# Patient Record
Sex: FEMALE | Race: WHITE | Employment: FULL TIME | URBAN - METROPOLITAN AREA
[De-identification: names, ages, dates, MRNs, and addresses within clinical notes are randomized per-mention and may not be internally consistent; named-entity substitution may affect disease eponyms.]

---

## 2017-07-24 ENCOUNTER — APPOINTMENT (OUTPATIENT)
Dept: MAMMOGRAPHY | Age: 42
End: 2017-07-24

## 2017-07-24 ENCOUNTER — OFFICE VISIT (OUTPATIENT)
Dept: OBGYN CLINIC | Age: 42
End: 2017-07-24

## 2017-07-24 VITALS
SYSTOLIC BLOOD PRESSURE: 132 MMHG | WEIGHT: 161 LBS | BODY MASS INDEX: 27.49 KG/M2 | DIASTOLIC BLOOD PRESSURE: 80 MMHG | HEIGHT: 64 IN

## 2017-07-24 DIAGNOSIS — Z12.31 ENCOUNTER FOR SCREENING MAMMOGRAM FOR MALIGNANT NEOPLASM OF BREAST: ICD-10-CM

## 2017-07-24 DIAGNOSIS — Z01.419 ENCOUNTER FOR GYNECOLOGICAL EXAMINATION WITHOUT ABNORMAL FINDING: Primary | ICD-10-CM

## 2017-07-24 DIAGNOSIS — R92.8 ABNORMAL MAMMOGRAM OF BOTH BREASTS: ICD-10-CM

## 2017-07-24 NOTE — MR AVS SNAPSHOT
Visit Information Date & Time Provider Department Dept. Phone Encounter #  
 7/24/2017  9:40 AM Jabier Baker MD [facility] [phone] [id number] Upcoming Health Maintenance Date Due INFLUENZA AGE 9 TO ADULT 8/1/2017 PAP AKA CERVICAL CYTOLOGY 7/19/2019 Allergies as of 7/24/2017  Review Complete On: 7/24/2017 By: Luisito Alarcon Severity Noted Reaction Type Reactions Hydrocod-cpm-pe-acetaminophen  11/20/2013    Other (comments) Unknown reaction Current Immunizations  Never Reviewed No immunizations on file. Not reviewed this visit Vitals BP Height(growth percentile) Weight(growth percentile) BMI OB Status Smoking Status 132/80 5' 4\" (1.626 m) 161 lb (73 kg) 27.64 kg/m2 Having regular periods Never Smoker BMI and BSA Data Body Mass Index Body Surface Area  
 27.64 kg/m 2 1.82 m 2 Preferred Pharmacy Pharmacy Name Phone [address] [facility] AT [address] [place] [address]. [phone] Your Updated Medication List  
  
   
This list is accurate as of: 7/24/17  9:56 AM.  Always use your most recent med list.  
  
  
  
  
 ondansetron 4 mg disintegrating tablet Commonly known as:  ZOFRAN ODT Take 1 Tab by mouth every eight (8) hours as needed for Nausea. XANAX 0.25 mg tablet Generic drug:  ALPRAZolam  
Take  by mouth. ZOLOFT 100 mg tablet Generic drug:  sertraline Take  by mouth daily. Introducing [facility]! Dear William Drew: 
Thank you for requesting a [employer] account. Our records indicate that you already have an active [employer] account. You can access your account anytime at [email or web address] Did you know that you can access your hospital and ER discharge instructions at any time in [employer]? You can also review all of your test results from your hospital stay or ER visit. Additional Information If you have questions, please visit the Frequently Asked Questions section of the [employer] website at [email or web address] Remember, [employer] is NOT to be used for urgent needs. For medical emergencies, dial 911. Now available from your iPhone and Android! Please provide this summary of care documentation to your next provider. Your primary care clinician is listed as Marycruz Floyd. If you have any questions after today's visit, please call [phone].

## 2017-07-24 NOTE — PROGRESS NOTES
Annual exam ages 40-58    [place] is a ,  36 y.o. female [facility] No LMP recorded. .    She presents for her annual checkup. She is having significant left sided pain for about 2 weeks out of the month for the last several months. With regard to the Gardasil vaccine, she is older than the age for which it is FDA approved. Menstrual status:    Her periods are moderate in flow. She is using three to five pads or tampons per day, usually regular and last 26-30 days. She denies dysmenorrhea. She reports no premenstrual symptoms. Contraception:    The current method of family planning is vasectomy. Sexual history:    She  reports that she currently engages in sexual activity and has had male partners. She reports using the following method of birth control/protection: Surgical.    Medical conditions:    Since her last annual GYN exam about one year ago, she has not the following changes in her health history: none. Pap and Mammogram History:    Her most recent Pap smear was normal, obtained 1 year(s) ago. The patient had her mammogram today in our office. Breast Cancer History/Substance Abuse: negative    Osteoporosis History:    Family history does not include a first or second degree relative with osteopenia or osteoporosis. Past Medical History:   Diagnosis Date    Chronic urinary tract infection     Gastric ulcer     Pap smear for cervical cancer screening 16 neg HPV NEG    12 neg HPV NEG 16 neg HPV NEG     Past Surgical History:   Procedure Laterality Date    HX BREAST BIOPSY      HX CYST REMOVAL      HX SALPINGO-OOPHORECTOMY      l/s right       Current Outpatient Prescriptions   Medication Sig Dispense Refill    ALPRAZolam (XANAX) 0.25 mg tablet Take  by mouth.  sertraline (ZOLOFT) 100 mg tablet Take  by mouth daily.       ondansetron (ZOFRAN ODT) 4 mg disintegrating tablet Take 1 Tab by mouth every eight (8) hours as needed for Nausea. 10 Tab 0     Allergies: Hydrocod-cpm-pe-acetaminophen     Tobacco History:  reports that she has never smoked. She has never used smokeless tobacco.  Alcohol Abuse:  reports that she does not drink alcohol. Drug Abuse:  reports that she does not use illicit drugs.     Family Medical/Cancer History:   Family History   Problem Relation Age of Onset    Hypertension Maternal Grandfather     Heart Attack Maternal Grandfather     Breast Cancer Other      great aunt    No Known Problems Mother         Review of Systems - History obtained from the patient  Constitutional: negative for weight loss, fever, night sweats  HEENT: negative for hearing loss, earache, congestion, snoring, sorethroat  CV: negative for chest pain, palpitations, edema  Resp: negative for cough, shortness of breath, wheezing  GI: negative for change in bowel habits, abdominal pain, black or bloody stools  : negative for frequency, dysuria, hematuria, vaginal discharge  MSK: negative for back pain, joint pain, muscle pain  Breast: negative for breast lumps, nipple discharge, galactorrhea  Skin :negative for itching, rash, hives  Neuro: negative for dizziness, headache, confusion, weakness  Psych: negative for anxiety, depression, change in mood  Heme/lymph: negative for bleeding, bruising, pallor    Physical Exam    Visit Vitals    /80    Ht 5' 4\" (1.626 m)    Wt 161 lb (73 kg)    BMI 27.64 kg/m2       Constitutional  · Appearance: well-nourished, well developed, alert, in no acute distress    HENT  · Head and Face: appears normal    Neck  · Inspection/Palpation: normal appearance, no masses or tenderness  · Lymph Nodes: no lymphadenopathy present  · Thyroid: gland size normal, nontender, no nodules or masses present on palpation    Chest  · Respiratory Effort: breathing unlabored    Breasts  · Inspection of Breasts: breasts symmetrical, no skin changes, no discharge present, nipple appearance normal, no skin retraction present  · Palpation of Breasts and Axillae: no masses present on palpation, no breast tenderness  · Axillary Lymph Nodes: no lymphadenopathy present    Gastrointestinal  · Abdominal Examination: abdomen non-tender to palpation, normal bowel sounds, no masses present  · Liver and spleen: no hepatomegaly present, spleen not palpable  · Hernias: no hernias identified    Genitourinary  · External Genitalia: normal appearance for age, no discharge present, no tenderness present, no inflammatory lesions present, no masses present, no atrophy present  · Vagina: normal vaginal vault without central or paravaginal defects, no discharge present, no inflammatory lesions present, no masses present  · Bladder: non-tender to palpation  · Urethra: appears normal  · Cervix: normal   · Uterus: normal size, shape and consistency  · Adnexa: no adnexal tenderness present, no adnexal masses present  · Perineum: perineum within normal limits, no evidence of trauma, no rashes or skin lesions present  · Anus: anus within normal limits, no hemorrhoids present  · Inguinal Lymph Nodes: no lymphadenopathy present    Skin  · General Inspection: no rash, no lesions identified    Neurologic/Psychiatric  · Mental Status:  · Orientation: grossly oriented to person, place and time  · Mood and Affect: mood normal, affect appropriate    Assessment:  Routine gynecologic examination  Her current medical status is satisfactory with no evidence of significant gynecologic issues.     Plan:  Counseled re: diet, exercise, healthy lifestyle  Return for yearly wellness visits  Rec annual mammogram  RTO for ultrasound due to pelvic pain

## 2017-08-04 ENCOUNTER — TELEPHONE (OUTPATIENT)
Dept: OBGYN CLINIC | Age: 42
End: 2017-08-04

## 2017-08-04 ENCOUNTER — HOSPITAL ENCOUNTER (OUTPATIENT)
Dept: MAMMOGRAPHY | Age: 42
Discharge: HOME | End: 2017-08-04
Attending: OBSTETRICS & GYNECOLOGY
Payer: COMMERCIAL

## 2017-08-04 DIAGNOSIS — R92.8 ABNORMAL MAMMOGRAM OF BOTH BREASTS: ICD-10-CM

## 2017-08-04 PROCEDURE — 76642 ULTRASOUND BREAST LIMITED: CPT

## 2017-08-04 PROCEDURE — 77066 DX MAMMO INCL CAD BI: CPT

## 2017-08-04 NOTE — TELEPHONE ENCOUNTER
Eri Fischer from [address] [place] [address] calling about Patient's mammogram.  In the left breast there is a cyst that is complex and is recommended to have needle aspiration. The report will be sent over today.

## 2018-01-12 ENCOUNTER — PATIENT MESSAGE (OUTPATIENT)
Dept: OBGYN CLINIC | Age: 43
End: 2018-01-12

## 2018-01-12 RX ORDER — FLUCONAZOLE 150 MG/1
150 TABLET ORAL DAILY
Qty: 3 TAB | Refills: 0 | Status: SHIPPED | OUTPATIENT
Start: 2018-01-12 | End: 2018-01-15

## 2018-10-24 ENCOUNTER — OFFICE VISIT (OUTPATIENT)
Dept: OBGYN CLINIC | Age: 43
End: 2018-10-24

## 2018-10-24 VITALS
BODY MASS INDEX: 27.49 KG/M2 | SYSTOLIC BLOOD PRESSURE: 118 MMHG | HEIGHT: 64 IN | WEIGHT: 161 LBS | DIASTOLIC BLOOD PRESSURE: 82 MMHG

## 2018-10-24 DIAGNOSIS — Z01.419 ENCOUNTER FOR GYNECOLOGICAL EXAMINATION WITHOUT ABNORMAL FINDING: Primary | ICD-10-CM

## 2018-10-24 RX ORDER — CALC/MAG/B COMPLEX/D3/HERB 61
TABLET ORAL
COMMUNITY

## 2018-10-24 NOTE — PROGRESS NOTES
Annual exam ages 40-58    [place] is a ,  39 y.o. female [facility] No LMP recorded. .    She presents for her annual checkup. She is having no significant problems. With regard to the Gardasil vaccine, she is older than the age for which it is FDA approved. Menstrual status:    Her periods are absent due to menopause. She reports having some spotting in May 2018. She denies dysmenorrhea. She reports no premenstrual symptoms. Contraception:    The current method of family planning is vasectomy. Sexual history:    She  reports that she currently engages in sexual activity and has had partners who are Male. She reports using the following method of birth control/protection: Surgical.    Medical conditions:    Since her last annual GYN exam about one year ago, she has not the following changes in her health history: none. Pap and Mammogram History:    Her most recent Pap smear was normal, obtained 2 year(s) ago. The patient had her mammogram today in our office. Breast Cancer History/Substance Abuse: +great aunt    Osteoporosis History:    Family history does not include a first or second degree relative with osteopenia or osteoporosis. Past Medical History:   Diagnosis Date    Chronic urinary tract infection     Gastric ulcer     Pap smear for cervical cancer screening 16 neg HPV NEG    12 neg HPV NEG 16 neg HPV NEG     Past Surgical History:   Procedure Laterality Date    HX BREAST BIOPSY Left 2004    Cyst removed    HX CYST INCISION AND DRAINAGE Bilateral     HX CYST REMOVAL      HX SALPINGO-OOPHORECTOMY      l/s right       Current Outpatient Medications   Medication Sig Dispense Refill    lansoprazole (PREVACID) 15 mg capsule Take  by mouth Daily (before breakfast).  ALPRAZolam (XANAX) 0.25 mg tablet Take  by mouth.       ondansetron (ZOFRAN ODT) 4 mg disintegrating tablet Take 1 Tab by mouth every eight (8) hours as needed for Nausea. 10 Tab 0    sertraline (ZOLOFT) 100 mg tablet Take  by mouth daily. Allergies: Hydrocod-cpm-pe-acetaminophen     Tobacco History:  reports that  has never smoked. she has never used smokeless tobacco.  Alcohol Abuse:  reports that she does not drink alcohol. Drug Abuse:  reports that she does not use drugs.     Family Medical/Cancer History:   Family History   Problem Relation Age of Onset    Hypertension Maternal Grandfather     Heart Attack Maternal Grandfather     Breast Cancer Other         great aunt    No Known Problems Mother         Review of Systems - History obtained from the patient  Constitutional: negative for weight loss, fever, night sweats  HEENT: negative for hearing loss, earache, congestion, snoring, sorethroat  CV: negative for chest pain, palpitations, edema  Resp: negative for cough, shortness of breath, wheezing  GI: negative for change in bowel habits, abdominal pain, black or bloody stools  : negative for frequency, dysuria, hematuria, vaginal discharge  MSK: negative for back pain, joint pain, muscle pain  Breast: negative for breast lumps, nipple discharge, galactorrhea  Skin :negative for itching, rash, hives  Neuro: negative for dizziness, headache, confusion, weakness  Psych: negative for anxiety, depression, change in mood  Heme/lymph: negative for bleeding, bruising, pallor    Physical Exam    Visit Vitals  /82   Ht 5' 4\" (1.626 m)   Wt 161 lb (73 kg)   BMI 27.64 kg/m²       Constitutional  · Appearance: well-nourished, well developed, alert, in no acute distress    HENT  · Head and Face: appears normal    Neck  · Inspection/Palpation: normal appearance, no masses or tenderness  · Lymph Nodes: no lymphadenopathy present  · Thyroid: gland size normal, nontender, no nodules or masses present on palpation    Chest  · Respiratory Effort: breathing unlabored    Breasts  · Inspection of Breasts: breasts symmetrical, no skin changes, no discharge present, nipple appearance normal, no skin retraction present  · Palpation of Breasts and Axillae: no masses present on palpation, no breast tenderness  · Axillary Lymph Nodes: no lymphadenopathy present    Gastrointestinal  · Abdominal Examination: abdomen non-tender to palpation, normal bowel sounds, no masses present  · Liver and spleen: no hepatomegaly present, spleen not palpable  · Hernias: no hernias identified    Genitourinary  · External Genitalia: normal appearance for age, no discharge present, no tenderness present, no inflammatory lesions present, no masses present, no atrophy present  · Vagina: normal vaginal vault without central or paravaginal defects, no discharge present, no inflammatory lesions present, no masses present  · Bladder: non-tender to palpation  · Urethra: appears normal  · Cervix: normal   · Uterus: normal size, shape and consistency  · Adnexa: no adnexal tenderness present, no adnexal masses present  · Perineum: perineum within normal limits, no evidence of trauma, no rashes or skin lesions present  · Anus: anus within normal limits, no hemorrhoids present  · Inguinal Lymph Nodes: no lymphadenopathy present    Skin  · General Inspection: no rash, no lesions identified    Neurologic/Psychiatric  · Mental Status:  · Orientation: grossly oriented to person, place and time  · Mood and Affect: mood normal, affect appropriate    Assessment:  Routine gynecologic examination  Her current medical status is satisfactory with no evidence of significant gynecologic issues. .  Doing well, perimenopausal    Plan:  Counseled re: diet, exercise, healthy lifestyle  Return for yearly wellness visits  Rec annual mammogram

## 2018-10-24 NOTE — PATIENT INSTRUCTIONS

## 2018-11-20 ENCOUNTER — PATIENT MESSAGE (OUTPATIENT)
Dept: OBGYN CLINIC | Age: 43
End: 2018-11-20

## 2018-11-21 RX ORDER — FLUCONAZOLE 150 MG/1
150 TABLET ORAL DAILY
Qty: 3 TAB | Refills: 0 | Status: SHIPPED | OUTPATIENT
Start: 2018-11-21 | End: 2018-11-24

## 2019-01-07 ENCOUNTER — OFFICE VISIT (OUTPATIENT)
Dept: OBGYN CLINIC | Age: 44
End: 2019-01-07

## 2019-01-07 DIAGNOSIS — N94.9 VAGINAL BURNING: Primary | ICD-10-CM

## 2019-01-07 LAB
BILIRUB UR QL STRIP: NEGATIVE
GLUCOSE UR-MCNC: NEGATIVE MG/DL
KETONES P FAST UR STRIP-MCNC: NEGATIVE MG/DL
PH UR STRIP: 4.6 [PH] (ref 4.6–8)
PROT UR QL STRIP: NEGATIVE
SP GR UR STRIP: 1 (ref 1–1.03)
UA UROBILINOGEN AMB POC: NORMAL (ref 0.2–1)
URINALYSIS CLARITY POC: NORMAL
URINALYSIS COLOR POC: NORMAL
URINE BLOOD POC: NEGATIVE
URINE LEUKOCYTES POC: NEGATIVE
URINE NITRITES POC: NEGATIVE

## 2019-01-07 RX ORDER — NYSTATIN AND TRIAMCINOLONE ACETONIDE 100000; 1 [USP'U]/G; MG/G
OINTMENT TOPICAL 2 TIMES DAILY
Qty: 1 TUBE | Refills: 3 | Status: SHIPPED | OUTPATIENT
Start: 2019-01-07 | End: 2019-01-14

## 2019-01-07 NOTE — PROGRESS NOTES
Chief Complaint   Vaginitis      HPI  39 y.o. female complains of scant vaginal discharge for several weeks. .No LMP recorded. She admits to additional symptoms at this time. burning  The patient  admits to aggravating factors  She admits to exposure to new chemicals ot hygenic agents  Previous treatment included:  OTC monistat and diflucan    Past Medical History:   Diagnosis Date    Chronic urinary tract infection     Gastric ulcer     Pap smear for cervical cancer screening 7/19/16 neg HPV NEG    1/23/12 neg HPV NEG 7/19/16 neg HPV NEG     Past Surgical History:   Procedure Laterality Date    HX BREAST BIOPSY Left 12/2004    Cyst removed    HX CYST INCISION AND DRAINAGE Bilateral     HX CYST REMOVAL      HX SALPINGO-OOPHORECTOMY      l/s right     Social History     Occupational History    Not on file   Tobacco Use    Smoking status: Never Smoker    Smokeless tobacco: Never Used   Substance and Sexual Activity    Alcohol use: No    Drug use: No    Sexual activity: Yes     Partners: Male     Birth control/protection: Surgical     Family History   Problem Relation Age of Onset    Hypertension Maternal Grandfather     Heart Attack Maternal Grandfather     Breast Cancer Other         great aunt    No Known Problems Mother         Allergies   Allergen Reactions    Hydrocod-Cpm-Pe-Acetaminophen Other (comments)     Unknown reaction     Prior to Admission medications    Medication Sig Start Date End Date Taking? Authorizing Provider   lansoprazole (PREVACID) 15 mg capsule Take  by mouth Daily (before breakfast). Provider, Historical   ondansetron (ZOFRAN ODT) 4 mg disintegrating tablet Take 1 Tab by mouth every eight (8) hours as needed for Nausea. 1/11/16   Artis Bruce DO   ALPRAZolam Thierry Hail) 0.25 mg tablet Take  by mouth. Provider, Historical   sertraline (ZOLOFT) 100 mg tablet Take  by mouth daily.     Provider, Historical                      Review of Systems - History obtained from the patient  Constitutional: negative for weight loss, fever, night sweats  Breast: negative for breast lumps, nipple discharge, galactorrhea  GI: negative for change in bowel habits, abdominal pain, black or bloody stools  : negative for frequency, dysuria, hematuria  MSK: negative for back pain, joint pain, muscle pain  Skin: negative for itching, rash, hives  Neuro: negative for dizziness, headache, confusion, weakness  Psych: negative for anxiety, depression, change in mood  Heme/lymph: negative for bleeding, bruising, pallor       Objective: There were no vitals taken for this visit. Physical Exam:   PHYSICAL EXAMINATION    Constitutional  · Appearance: well-nourished, well developed, alert, in no acute distress    HENT  · Head and Face: appears normal    Genitourinary  · External Genitalia: normal appearance for age, + discharge present, no tenderness present, no inflammatory lesions present, no masses present, no atrophy present  · Vagina:  + discharge present, otherwise normal vaginal vault without central or paravaginal defects, no inflammatory lesions present, no masses present  · Bladder: non-tender to palpation  · Urethra: appears normal  · Cervix: normal   · Uterus: normal size, shape and consistency  · Adnexa: no adnexal tenderness present, no adnexal masses present  · Perineum: perineum within normal limits, no evidence of trauma, no rashes or skin lesions present  · Anus: anus within normal limits, no hemorrhoids present  · Inguinal Lymph Nodes: no lymphadenopathy present    Skin  · General Inspection: no rash, no lesions identified    Neurologic/Psychiatric  · Mental Status:  · Orientation: grossly oriented to person, place and time  · Mood and Affect: mood normal, affect appropriate      Assessment:   Vulvar burning- will treat with mycolog and f/u with nuswab. If no improvement consider clobetesol. Plan:   ROV prn if symptoms persist or worsen.

## 2019-01-09 LAB
A VAGINAE DNA VAG QL NAA+PROBE: NORMAL SCORE
BVAB2 DNA VAG QL NAA+PROBE: NORMAL SCORE
C ALBICANS DNA VAG QL NAA+PROBE: NEGATIVE
C GLABRATA DNA VAG QL NAA+PROBE: NEGATIVE
MEGA1 DNA VAG QL NAA+PROBE: NORMAL SCORE

## 2019-01-24 ENCOUNTER — OFFICE VISIT (OUTPATIENT)
Dept: OBGYN CLINIC | Age: 44
End: 2019-01-24

## 2019-01-24 ENCOUNTER — HOSPITAL ENCOUNTER (OUTPATIENT)
Dept: LAB | Age: 44
Discharge: HOME | End: 2019-01-24

## 2019-01-24 DIAGNOSIS — N94.9 VAGINAL BURNING: Primary | ICD-10-CM

## 2019-01-24 NOTE — PROGRESS NOTES
Vaginitis evaluation    Chief Complaint   Vaginitis      HPI  39 y.o. female complains of vulvar burningfor months. No LMP recorded. She denies additional symptoms at this time. The patient denies aggravating factors except urination. She is not concerned about possible STI exposure at this time. She denies exposure to new chemicals ot hygenic agents  Previous treatment included: diflucan and mycolog that resulted in minimal relief. Past Medical History:   Diagnosis Date    Chronic urinary tract infection     Gastric ulcer     Pap smear for cervical cancer screening 7/19/16 neg HPV NEG    1/23/12 neg HPV NEG 7/19/16 neg HPV NEG     Past Surgical History:   Procedure Laterality Date    HX BREAST BIOPSY Left 12/2004    Cyst removed    HX CYST INCISION AND DRAINAGE Bilateral     HX CYST REMOVAL      HX SALPINGO-OOPHORECTOMY      l/s right     Social History     Occupational History    Not on file   Tobacco Use    Smoking status: Never Smoker    Smokeless tobacco: Never Used   Substance and Sexual Activity    Alcohol use: No    Drug use: No    Sexual activity: Yes     Partners: Male     Birth control/protection: Surgical     Family History   Problem Relation Age of Onset    Hypertension Maternal Grandfather     Heart Attack Maternal Grandfather     Breast Cancer Other         great aunt    No Known Problems Mother         Allergies   Allergen Reactions    Hydrocod-Cpm-Pe-Acetaminophen Other (comments)     Unknown reaction     Prior to Admission medications    Medication Sig Start Date End Date Taking? Authorizing Provider   clobetasol (TEMOVATE) 0.05 % ointment Apply  to affected area two (2) times a day. 1/11/19   Arnoldo Duff MD   lansoprazole (PREVACID) 15 mg capsule Take  by mouth Daily (before breakfast). Provider, Historical   ondansetron (ZOFRAN ODT) 4 mg disintegrating tablet Take 1 Tab by mouth every eight (8) hours as needed for Nausea.  1/11/16   Marya Alvarado DO ALPRAZolam (XANAX) 0.25 mg tablet Take  by mouth. Provider, Historical   sertraline (ZOLOFT) 100 mg tablet Take  by mouth daily. Provider, Historical                      Review of Systems - History obtained from the patient  Constitutional: negative for weight loss, fever, night sweats  Breast: negative for breast lumps, nipple discharge, galactorrhea  GI: negative for change in bowel habits, abdominal pain, black or bloody stools  : negative for frequency, dysuria, hematuria  MSK: negative for back pain, joint pain, muscle pain  Skin: negative for itching, rash, hives  Neuro: negative for dizziness, headache, confusion, weakness  Psych: negative for anxiety, depression, change in mood  Heme/lymph: negative for bleeding, bruising, pallor       Objective: There were no vitals taken for this visit.     Physical Exam:   PHYSICAL EXAMINATION    Constitutional  · Appearance: well-nourished, well developed, alert, in no acute distress    HENT  · Head and Face: appears normal    Genitourinary  · External Genitalia: normal appearance for age, no discharge present, no tenderness present, no inflammatory lesions present, no masses present, no atrophy present  · Vagina:  no discharge present, otherwise normal vaginal vault without central or paravaginal defects, no inflammatory lesions present, no masses present  · Bladder: non-tender to palpation  · Urethra: appears normal  · Cervix: normal   · Uterus: normal size, shape and consistency  · Adnexa: no adnexal tenderness present, no adnexal masses present  · Perineum: perineum within normal limits, no evidence of trauma, no rashes or skin lesions present  · Anus: anus within normal limits, no hemorrhoids present  · Inguinal Lymph Nodes: no lymphadenopathy present    Skin  · General Inspection: no rash, no lesions identified    Neurologic/Psychiatric  · Mental Status:  · Orientation: grossly oriented to person, place and time  · Mood and Affect: mood normal, affect appropriate      Assessment:   Vaginitis, previous normal nuswab, will perform biopsy today. Will f/u with biopsy and repeat nuswab. Plan:   ROV prn if symptoms persist or worsen. [facility]  OFFICE PROCEDURE PROGRESS NOTE        Chart reviewed for the following:   Sarmad HAMEED, have reviewed the History, Physical and updated the Allergic reactions for Leon Menard performed immediately prior to start of procedure:   Sarmad HAMEED, have performed the following reviews on Kristal Basilio prior to the start of the procedure:            * Patient was identified by name and date of birth   * Agreement on procedure being performed was verified  * Risks and Benefits explained to the patient  * Procedure site verified and marked as necessary  * Patient was positioned for comfort  * Consent was signed and verified     Time: 324      Date of procedure: 1/24/2019    Procedure performed by:  General Elisa MD    Provider assisted by: Kashif Casanova    Patient assisted by: self    How tolerated by patient: tolerated the procedure well with no complications    Post Procedural Pain Scale: 2 - Hurts Little Bit    Comments: none  Procedure note: Vulvar/Vaginal biopsy    Indications:  Kristal Basilio is a 39 y.o. female [facility] that was found to have vaginal burning. After being presented with the risks, benefits and specific details of the procedure, she had no further questions. Procedure: The patient was placed on the table in a dorsal lithotomy position and draped in the appropriate manner. The area was prepped with Zephiran . Once cleansed, the area was injected with three cc's of 1% Lidocaine without epinephrine, using a 25 gauge needle. After adequate anesthesia was reached, the skin was flattened with one hand and the entire lesion of the abnormal area was excised with an iris scissors. It was 5 mm in size.  The specimen was placed in formalin and sent to pathology for evaluation. Pressure was applied to the biopsy site to control bleeding and no sutures were placed to close the wound. Hemostasis was adequate with direct pressure and silver nitrate. The patient tolerated the procedure well. There were no complications. She was observed for 10 minutes and was discharged in good condition.

## 2019-01-25 LAB — SPECIMEN SENT TO LAB,INSX: NORMAL

## 2019-02-06 LAB
DX ICD CODE: NORMAL
DX ICD CODE: NORMAL
PATH REPORT.FINAL DX SPEC: NORMAL
PATH REPORT.GROSS SPEC: NORMAL
PATH REPORT.SITE OF ORIGIN SPEC: NORMAL
PATHOLOGIST NAME: NORMAL
PAYMENT PROCEDURE: NORMAL

## 2019-06-24 ENCOUNTER — PATIENT MESSAGE (OUTPATIENT)
Dept: OBGYN CLINIC | Age: 44
End: 2019-06-24

## 2019-07-08 ENCOUNTER — OFFICE VISIT (OUTPATIENT)
Dept: OBGYN CLINIC | Age: 44
End: 2019-07-08

## 2019-07-08 DIAGNOSIS — N89.8 VAGINAL ITCHING: Primary | ICD-10-CM

## 2019-07-08 RX ORDER — DROSPIRENONE AND ETHINYL ESTRADIOL 0.02-3(28)
1 KIT ORAL DAILY
Qty: 3 TAB | Refills: 0 | Status: SHIPPED | OUTPATIENT
Start: 2019-07-08 | End: 2019-07-09 | Stop reason: CLARIF

## 2019-07-08 RX ORDER — NYSTATIN AND TRIAMCINOLONE ACETONIDE 100000; 1 [USP'U]/G; MG/G
OINTMENT TOPICAL 2 TIMES DAILY
Qty: 30 G | Refills: 0 | Status: SHIPPED | OUTPATIENT
Start: 2019-07-08 | End: 2019-07-15

## 2019-07-08 NOTE — PROGRESS NOTES
Chief Complaint   Vaginitis      HPI  38 y.o. female complains of white vaginal discharge for a few weeks. .No LMP recorded. She admits to additional symptoms at this time. Itching and irritation  The patient  denies aggravating factors  She denies exposure to new chemicals ot hygenic agents  Previous treatment included:  none    Past Medical History:   Diagnosis Date    Chronic urinary tract infection     Gastric ulcer     Pap smear for cervical cancer screening 7/19/16 neg HPV NEG    1/23/12 neg HPV NEG 7/19/16 neg HPV NEG     Past Surgical History:   Procedure Laterality Date    HX BREAST BIOPSY Left 12/2004    Cyst removed    HX CYST INCISION AND DRAINAGE Bilateral     HX CYST REMOVAL      HX SALPINGO-OOPHORECTOMY      l/s right     Social History     Occupational History    Not on file   Tobacco Use    Smoking status: Never Smoker    Smokeless tobacco: Never Used   Substance and Sexual Activity    Alcohol use: No    Drug use: No    Sexual activity: Yes     Partners: Male     Birth control/protection: Surgical     Family History   Problem Relation Age of Onset    Hypertension Maternal Grandfather     Heart Attack Maternal Grandfather     Breast Cancer Other         great aunt    No Known Problems Mother         Allergies   Allergen Reactions    Hydrocod-Cpm-Pe-Acetaminophen Other (comments)     Unknown reaction     Prior to Admission medications    Medication Sig Start Date End Date Taking? Authorizing Provider   clobetasol (TEMOVATE) 0.05 % ointment Apply  to affected area two (2) times a day. 1/11/19   Sony Stock MD   lansoprazole (PREVACID) 15 mg capsule Take  by mouth Daily (before breakfast). Provider, Historical   ondansetron (ZOFRAN ODT) 4 mg disintegrating tablet Take 1 Tab by mouth every eight (8) hours as needed for Nausea. 1/11/16   Alysia Fine DO   ALPRAZolam Makeda Batch) 0.25 mg tablet Take  by mouth.     Provider, Historical   sertraline (ZOLOFT) 100 mg tablet Take by mouth daily. Provider, Historical                      Review of Systems - History obtained from the patient  Constitutional: negative for weight loss, fever, night sweats  Breast: negative for breast lumps, nipple discharge, galactorrhea  GI: negative for change in bowel habits, abdominal pain, black or bloody stools  : negative for frequency, dysuria, hematuria  MSK: negative for back pain, joint pain, muscle pain  Skin: negative for itching, rash, hives  Neuro: negative for dizziness, headache, confusion, weakness  Psych: negative for anxiety, depression, change in mood  Heme/lymph: negative for bleeding, bruising, pallor       Objective: There were no vitals taken for this visit.     Physical Exam:   PHYSICAL EXAMINATION    Constitutional  · Appearance: well-nourished, well developed, alert, in no acute distress    HENT  · Head and Face: appears normal    Genitourinary  · External Genitalia: normal appearance for age, + discharge present, no tenderness present, no inflammatory lesions present, no masses present, no atrophy present  · Vagina:  + discharge present, otherwise normal vaginal vault without central or paravaginal defects, no inflammatory lesions present, no masses present  · Bladder: non-tender to palpation  · Urethra: appears normal  · Cervix: normal   · Uterus: normal size, shape and consistency  · Adnexa: no adnexal tenderness present, no adnexal masses present  · Perineum: perineum within normal limits, no evidence of trauma, no rashes or skin lesions present  · Anus: anus within normal limits, no hemorrhoids present  · Inguinal Lymph Nodes: no lymphadenopathy present    Skin  · General Inspection: no rash, no lesions identified    Neurologic/Psychiatric  · Mental Status:  · Orientation: grossly oriented to person, place and time  · Mood and Affect: mood normal, affect appropriate      Assessment:   Vaginitis with intense vaginal itching- likely yeast, will treat with diflucan and f/u with nuswab    Plan:   ROV prn if symptoms persist or worsen.

## 2019-07-09 ENCOUNTER — PATIENT MESSAGE (OUTPATIENT)
Dept: OBGYN CLINIC | Age: 44
End: 2019-07-09

## 2019-07-09 RX ORDER — FLUCONAZOLE 150 MG/1
150 TABLET ORAL DAILY
Qty: 3 TAB | Refills: 1 | Status: SHIPPED | OUTPATIENT
Start: 2019-07-09 | End: 2019-07-09 | Stop reason: SDUPTHER

## 2019-07-09 RX ORDER — FLUCONAZOLE 150 MG/1
150 TABLET ORAL DAILY
Qty: 3 TAB | Refills: 1 | Status: SHIPPED | OUTPATIENT
Start: 2019-07-09 | End: 2019-07-12

## 2019-07-17 LAB
A VAGINAE DNA VAG QL NAA+PROBE: ABNORMAL SCORE
BVAB2 DNA VAG QL NAA+PROBE: ABNORMAL SCORE
C ALBICANS DNA VAG QL NAA+PROBE: POSITIVE
C GLABRATA DNA VAG QL NAA+PROBE: NEGATIVE
MEGA1 DNA VAG QL NAA+PROBE: ABNORMAL SCORE

## 2019-11-18 ENCOUNTER — OFFICE VISIT (OUTPATIENT)
Dept: OBGYN CLINIC | Age: 44
End: 2019-11-18

## 2019-11-18 VITALS
DIASTOLIC BLOOD PRESSURE: 84 MMHG | SYSTOLIC BLOOD PRESSURE: 126 MMHG | WEIGHT: 152 LBS | HEIGHT: 64 IN | BODY MASS INDEX: 25.95 KG/M2

## 2019-11-18 DIAGNOSIS — N89.8 VAGINAL IRRITATION: Primary | ICD-10-CM

## 2019-11-18 DIAGNOSIS — Z01.419 ENCOUNTER FOR GYNECOLOGICAL EXAMINATION WITHOUT ABNORMAL FINDING: ICD-10-CM

## 2019-11-18 RX ORDER — NYSTATIN AND TRIAMCINOLONE ACETONIDE 100000; 1 [USP'U]/G; MG/G
OINTMENT TOPICAL 2 TIMES DAILY
Qty: 1 TUBE | Refills: 3 | Status: SHIPPED | OUTPATIENT
Start: 2019-11-18 | End: 2019-11-25

## 2019-11-18 NOTE — PROGRESS NOTES
Annual exam ages 40-58      [place] is a ,  38 y.o. female   No LMP recorded. She presents for her annual checkup. She is having significant vaginal irritation. She has used diflucan from PCP and clobetasol without improvement. Menstrual status:    Her periods are irregular- she states she has only had a few periods this year. She does not have dysmenorrhea. She reports no premenstrual symptoms. Contraception:    The current method of family planning is vasectomy. Hormonal status:  She reports no perimenstrual type symptoms. She is not having vasomotor symptoms. The patient is not using any ERT. Sexual history:    She  reports that she currently engages in sexual activity and has had partner(s) who are Male. She reports using the following method of birth control/protection: Surgical.    Medical conditions:    Since her last annual GYN exam about one year ago, she has not the following changes in her health history: none. Surgical history confirmed with patient. has a past surgical history that includes hx cyst removal; hx salpingo-oophorectomy; hx breast biopsy (Left, 2004); and hx cyst incision and drainage (Bilateral). Pap and Mammogram History:    Her most recent Pap smear was normal, obtained 3 year(s) ago. The patient had her mammogram today in our office. Breast Cancer History/Substance Abuse: +great aunt      Osteoporosis History:    Family history does not include a first or second degree relative with osteopenia or osteoporosis.       Past Medical History:   Diagnosis Date    Chronic urinary tract infection     Gastric ulcer     Pap smear for cervical cancer screening 16 neg HPV NEG    12 neg HPV NEG 16 neg HPV NEG     Past Surgical History:   Procedure Laterality Date    HX BREAST BIOPSY Left 2004    Cyst removed    HX CYST INCISION AND DRAINAGE Bilateral     HX CYST REMOVAL      HX SALPINGO-OOPHORECTOMY      l/s right       Current Outpatient Medications   Medication Sig Dispense Refill    clobetasol (TEMOVATE) 0.05 % ointment Apply  to affected area two (2) times a day. 15 g 0    lansoprazole (PREVACID) 15 mg capsule Take  by mouth Daily (before breakfast).  ondansetron (ZOFRAN ODT) 4 mg disintegrating tablet Take 1 Tab by mouth every eight (8) hours as needed for Nausea. 10 Tab 0    ALPRAZolam (XANAX) 0.25 mg tablet Take  by mouth.  sertraline (ZOLOFT) 100 mg tablet Take  by mouth daily. Allergies: Hydrocod-cpm-pe-acetaminophen     Tobacco History:  reports that she has never smoked. She has never used smokeless tobacco.  Alcohol Abuse:  reports that she does not drink alcohol. Drug Abuse:  reports that she does not use drugs. Family Medical/Cancer History:   Family History   Problem Relation Age of Onset    Hypertension Maternal Grandfather     Heart Attack Maternal Grandfather     Breast Cancer Other         great aunt    No Known Problems Mother         Review of Systems - History obtained from the patient  Constitutional: negative for weight loss, fever, night sweats  HEENT: negative for hearing loss, earache, congestion, snoring, sorethroat  CV: negative for chest pain, palpitations, edema  Resp: negative for cough, shortness of breath, wheezing  GI: negative for change in bowel habits, abdominal pain, black or bloody stools  : negative for frequency, dysuria, hematuria, vaginal discharge  MSK: negative for back pain, joint pain, muscle pain  Breast: negative for breast lumps, nipple discharge, galactorrhea  Skin :negative for itching, rash, hives  Neuro: negative for dizziness, headache, confusion, weakness  Psych: negative for anxiety, depression, change in mood  Heme/lymph: negative for bleeding, bruising, pallor    Physical Exam    There were no vitals taken for this visit.     Constitutional  · Appearance: well-nourished, well developed, alert, in no acute distress    HENT  · Head and Face: appears normal    Neck  · Inspection/Palpation: normal appearance, no masses or tenderness  · Lymph Nodes: no lymphadenopathy present  · Thyroid: gland size normal, nontender, no nodules or masses present on palpation    Chest  · Respiratory Effort: breathing unlabored    Breasts  · Inspection of Breasts: breasts symmetrical, no skin changes, no discharge present, nipple appearance normal, no skin retraction present  · Palpation of Breasts and Axillae: no masses present on palpation, no breast tenderness  · Axillary Lymph Nodes: no lymphadenopathy present    Gastrointestinal  · Abdominal Examination: abdomen non-tender to palpation, normal bowel sounds, no masses present  · Liver and spleen: no hepatomegaly present, spleen not palpable  · Hernias: no hernias identified    Genitourinary  · External Genitalia: normal appearance for age, no discharge present, no tenderness present, no inflammatory lesions present, no masses present, no atrophy present  · Vagina: normal vaginal vault without central or paravaginal defects, no discharge present, no inflammatory lesions present, no masses present  · Bladder: non-tender to palpation  · Urethra: appears normal  · Cervix: normal   · Uterus: normal size, shape and consistency  · Adnexa: no adnexal tenderness present, no adnexal masses present  · Perineum: perineum within normal limits, no evidence of trauma, no rashes or skin lesions present  · Anus: anus within normal limits, no hemorrhoids present  · Inguinal Lymph Nodes: no lymphadenopathy present    Skin  · General Inspection: no rash, no lesions identified    Neurologic/Psychiatric  · Mental Status:  · Orientation: grossly oriented to person, place and time  · Mood and Affect: mood normal, affect appropriate    Assessment:  Routine gynecologic examination  Her current medical status is satisfactory with no evidence of significant gynecologic issues.   New vulvar irritation- will treat with mycolog and f/u with nuswab    Plan:  Counseled re: diet, exercise, healthy lifestyle  Return for yearly wellness visits  Rec annual mammogram

## 2020-02-12 ENCOUNTER — HOSPITAL ENCOUNTER (OUTPATIENT)
Dept: NUCLEAR MEDICINE | Age: 45
Discharge: HOME | End: 2020-02-12
Attending: INTERNAL MEDICINE
Payer: COMMERCIAL

## 2020-02-12 DIAGNOSIS — R19.7 DIARRHEA: ICD-10-CM

## 2020-02-12 DIAGNOSIS — R68.81 EARLY SATIETY: ICD-10-CM

## 2020-02-12 DIAGNOSIS — R10.11 RUQ PAIN: ICD-10-CM

## 2020-02-12 DIAGNOSIS — R11.0 NAUSEA: ICD-10-CM

## 2020-02-12 DIAGNOSIS — K21.9 GERD (GASTROESOPHAGEAL REFLUX DISEASE): ICD-10-CM

## 2020-02-12 DIAGNOSIS — R19.4 CHANGE IN BOWEL HABIT: ICD-10-CM

## 2020-02-12 PROCEDURE — 78227 HEPATOBIL SYST IMAGE W/DRUG: CPT

## 2020-02-26 ENCOUNTER — PATIENT MESSAGE (OUTPATIENT)
Dept: OBGYN CLINIC | Age: 45
End: 2020-02-26

## 2020-02-26 RX ORDER — LIDOCAINE 50 MG/G
0.01 OINTMENT TOPICAL AS NEEDED
Qty: 1 TUBE | Refills: 3 | Status: SHIPPED | OUTPATIENT
Start: 2020-02-26

## 2020-02-27 ENCOUNTER — TELEPHONE (OUTPATIENT)
Dept: OBGYN CLINIC | Age: 45
End: 2020-02-27

## 2020-02-27 NOTE — TELEPHONE ENCOUNTER
Call received at 605am    38year old patient last seen in the office on 11/18/19. [facility] calling to request a pathology report. This nurse advised of need to send the office a faxed request to get the report. Fax number provided.

## 2020-03-10 ENCOUNTER — OFFICE VISIT (OUTPATIENT)
Dept: OBGYN CLINIC | Age: 45
End: 2020-03-10

## 2020-03-10 VITALS — BODY MASS INDEX: 25.06 KG/M2 | SYSTOLIC BLOOD PRESSURE: 139 MMHG | WEIGHT: 146 LBS | DIASTOLIC BLOOD PRESSURE: 77 MMHG

## 2020-03-10 DIAGNOSIS — N94.9: Primary | ICD-10-CM

## 2020-03-10 NOTE — PROGRESS NOTES
Gary Doyle is a [de-identified] P5,  38 y.o. female [facility] whose No LMP recorded. Patient is perimenopausal.  was on  who presents with a history of dyspareunia for a few months. The pain is described as deep and during intercourse. Pain occurs primarily in the \"cervix\". She reports that even with minimal touch of her cervix she has burning and pain. She also experiences this burning sensation even when she is not having intercourse. She reports the following aggravating factors: intercourse    No results found for any visits on 03/10/20. Past Medical History:   Diagnosis Date    Chronic urinary tract infection     Gastric ulcer     Pap smear for cervical cancer screening 7/19/16 neg HPV NEG    1/23/12 neg HPV NEG 7/19/16 neg HPV NEG     Past Surgical History:   Procedure Laterality Date    HX BREAST BIOPSY Left 12/2004    Cyst removed    HX CYST INCISION AND DRAINAGE Bilateral     HX CYST REMOVAL      HX SALPINGO-OOPHORECTOMY      l/s right     Social History     Occupational History    Not on file   Tobacco Use    Smoking status: Never Smoker    Smokeless tobacco: Never Used   Substance and Sexual Activity    Alcohol use: No    Drug use: No    Sexual activity: Yes     Partners: Male     Birth control/protection: Surgical     Family History   Problem Relation Age of Onset    Hypertension Maternal Grandfather     Heart Attack Maternal Grandfather     Breast Cancer Other         great aunt    No Known Problems Mother        Allergies   Allergen Reactions    Hydrocod-Cpm-Pe-Acetaminophen Other (comments)     Unknown reaction     Prior to Admission medications    Medication Sig Start Date End Date Taking? Authorizing Provider   lidocaine (XYLOCAINE) 5 % ointment Apply 0.007 Tubes to affected area as needed for Pain. 2/26/20   Timo Stewart MD   estradiol (ESTRACE) 0.01 % (0.1 mg/gram) vaginal cream Insert 1 g into vagina two (2) times a week.  12/6/19   Kip Killer MD SPIKE   clobetasol (TEMOVATE) 0.05 % ointment Apply  to affected area two (2) times a day. 1/11/19   Stepan La MD   lansoprazole (PREVACID) 15 mg capsule Take  by mouth Daily (before breakfast). Provider, Historical   ondansetron (ZOFRAN ODT) 4 mg disintegrating tablet Take 1 Tab by mouth every eight (8) hours as needed for Nausea. 1/11/16   Renzo Estevez DO   ALPRAZolam Marrion Jeans) 0.25 mg tablet Take  by mouth. Provider, Historical   sertraline (ZOLOFT) 100 mg tablet Take  by mouth daily.     Provider, Historical        Review of Systems: History obtained from the patient  Constitutional: negative for weight loss, fever, night sweats  GI: negative for change in bowel habits, abdominal pain, black or bloody stools  : negative for frequency, dysuria, hematuria, vaginal discharge  MSK: negative for back pain, joint pain, muscle pain  Skin: negative for itching, rash, hives  Neuro: negative for dizziness, headache, confusion, weakness  Psych: negative for anxiety, depression, change in mood  Heme/lymph: negative for bleeding, bruising, pallor    Objective:  Visit Vitals  /77   Wt 146 lb (66.2 kg)   BMI 25.06 kg/m²       Physical Exam:   PHYSICAL EXAMINATION    Constitutional  · Appearance: well-nourished, well developed, alert, in no acute distress    HENT  · Head and Face: appears normal    Gastrointestinal  · Abdominal Examination: abdomen non-tender to palpation, normal bowel sounds, no masses present  · Liver and spleen: no hepatomegaly present, spleen not palpable  · Hernias: no hernias identified    Genitourinary  · External Genitalia: normal appearance for age, no discharge present, no tenderness present, no inflammatory lesions present, no masses present, no atrophy present  · Vagina: normal vaginal vault without central or paravaginal defects, no discharge present, no inflammatory lesions present, no masses present  · Bladder: non-tender to palpation  · Urethra: appears normal  · Cervix: normal   · Uterus: normal size, shape and consistency  · Adnexa: no adnexal tenderness present, no adnexal masses present  · Perineum: perineum within normal limits, no evidence of trauma, no rashes or skin lesions present  · Anus: anus within normal limits, no hemorrhoids present  · Inguinal Lymph Nodes: no lymphadenopathy present    Skin  · General Inspection: no rash, no lesions identified    Neurologic/Psychiatric  · Mental Status:  · Orientation: grossly oriented to person, place and time  · Mood and Affect: mood normal, affect appropriate    Assessment:     Dyspareunia Extreme Cervical tenderness to minimal stimuli. Pt reports no concerns re: STD's, previous nuswab negative. Biopsy performed of her cervix and sent to pathology. RTO prn if symptoms persist or worsen. Instructions given to pt. Handouts given to pt. [facility]  OFFICE PROCEDURE PROGRESS NOTE        Chart reviewed for the following:   Ligia Ritchie MD, have reviewed the History, Physical and updated the Allergic reactions for [address] performed immediately prior to start of procedure:   Ligia Ritchie MD, have performed the following reviews on [facility] prior to the start of the procedure:            * Patient was identified by name and date of birth   * Agreement on procedure being performed was verified  * Risks and Benefits explained to the patient  * Procedure site verified and marked as necessary  * Patient was positioned for comfort  * Consent was signed and verified     Time: 1130    Date of procedure: 3/10/2020    Procedure performed by:  Trey Solares MD    Patient assisted by: self    How tolerated by patient: tolerated the procedure well but with the following complications: complaints of burning in the location of the biopsy     Post Procedural Pain Scale: 8 - Hurts Whole Lot. Lidocaine was subsequently applied.     Comments: none    Procedure note: Cervical Biopsy    Indications:  Darrell Boland is a 38 y.o. female [facility] that was found to have extreme sensitivity of her cervix. She has she has no obvious lesions. After being presented with the risks, benefits and specific details of the procedure, informed consent was obtained and signed and she had no further questions. Procedure: The patient was placed on the table in a dorsal lithotomy position and draped in the appropriate manner. The area was inspected and her cervix was cleansed with Zephorin. The area of tenderness was then biopsied with a Kevorchian punch biopsy. Pt reports severe burning at the biopsy site so 3 cc lidocaine was subsequently injected into her cervix. Monsels solution was applied. Excellent hemostasis was obtained. The patient tolerated the procedure well. There were no complications.

## 2020-03-13 LAB
DX ICD CODE: NORMAL
PATH REPORT.FINAL DX SPEC: NORMAL
PATH REPORT.GROSS SPEC: NORMAL
PATH REPORT.RELEVANT HX SPEC: NORMAL
PATH REPORT.SITE OF ORIGIN SPEC: NORMAL
PATHOLOGIST NAME: NORMAL
PAYMENT PROCEDURE: NORMAL

## 2020-03-18 ENCOUNTER — PATIENT MESSAGE (OUTPATIENT)
Dept: OBGYN CLINIC | Age: 45
End: 2020-03-18

## 2021-01-08 NOTE — TELEPHONE ENCOUNTER
HR=64 bpm, BCMV=710/71 mmhg, TpR9=701.0 %, Resp=17 B/min, EtCO2=34 mmHg, Apnea=1 Seconds, Pain=0, Edmund=10, Galvez=2, Comment=sinus Spoke with pt and she has an appt with Dr. Caprice Yepez on 8/9/17

## 2021-03-29 ENCOUNTER — OFFICE VISIT (OUTPATIENT)
Dept: OBGYN CLINIC | Age: 46
End: 2021-03-29

## 2021-03-29 VITALS — WEIGHT: 149.8 LBS | SYSTOLIC BLOOD PRESSURE: 161 MMHG | BODY MASS INDEX: 25.71 KG/M2 | DIASTOLIC BLOOD PRESSURE: 99 MMHG

## 2021-03-29 DIAGNOSIS — Z01.419 ENCOUNTER FOR GYNECOLOGICAL EXAMINATION WITHOUT ABNORMAL FINDING: Primary | ICD-10-CM

## 2021-03-29 PROCEDURE — 99396 PREV VISIT EST AGE 40-64: CPT | Performed by: OBSTETRICS & GYNECOLOGY

## 2021-03-29 NOTE — PROGRESS NOTES
Annual exam ages 40-58      [place] is a ,  54 y.o. female   No LMP recorded. Patient is perimenopausal.    She presents for her annual checkup. She is having no significant problems. With regard to the Gardasil vaccine, she is older than the FDA approved age to receive it. Menstrual status:    Her periods are absent in flow. Contraception:    The current method of family planning is vasectomy. Hormonal status:  She reports no perimenstrual type symptoms. She is not having vasomotor symptoms. The patient is not using any ERT. Sexual history:    She  reports being sexually active and has had partner(s) who are Male. She reports using the following method of birth control/protection: Surgical.    Medical conditions:    Since her last annual GYN exam about two years ago, she has not the following changes in her health history: none. Surgical history confirmed with patient. has a past surgical history that includes hx cyst removal; hx salpingo-oophorectomy; hx breast biopsy (Left, 2004); and hx cyst incision and drainage (Bilateral). Pap and Mammogram History:    Her most recent Pap smear was normal, obtained 5 year(s) ago. The patient had her mammogram today in our office. Breast Cancer History/Substance Abuse: negative      Osteoporosis History:    Family history does not include a first or second degree relative with osteopenia or osteoporosis.     Past Medical History:   Diagnosis Date    Chronic urinary tract infection     Gastric ulcer     Pap smear for cervical cancer screening 16 neg HPV NEG    12 neg HPV NEG 16 neg HPV NEG     Past Surgical History:   Procedure Laterality Date    HX BREAST BIOPSY Left 2004    Cyst removed    HX CYST INCISION AND DRAINAGE Bilateral     HX CYST REMOVAL      HX SALPINGO-OOPHORECTOMY      l/s right       Current Outpatient Medications   Medication Sig Dispense Refill    clobetasol (TEMOVATE) 0.05 % ointment Apply  to affected area two (2) times a day. 15 g 0    lansoprazole (PREVACID) 15 mg capsule Take  by mouth Daily (before breakfast).  ALPRAZolam (XANAX) 0.25 mg tablet Take  by mouth.  sertraline (ZOLOFT) 100 mg tablet Take  by mouth daily.  lidocaine (XYLOCAINE) 5 % ointment Apply 0.007 Tubes to affected area as needed for Pain. 1 Tube 3    estradiol (ESTRACE) 0.01 % (0.1 mg/gram) vaginal cream Insert 1 g into vagina two (2) times a week. 42.5 g 5    ondansetron (ZOFRAN ODT) 4 mg disintegrating tablet Take 1 Tab by mouth every eight (8) hours as needed for Nausea. 10 Tab 0     Allergies: Hydrocod-cpm-pe-acetaminophen     Tobacco History:  reports that she has never smoked. She has never used smokeless tobacco.  Alcohol Abuse:  reports no history of alcohol use. Drug Abuse:  reports no history of drug use.     Family Medical/Cancer History:   Family History   Problem Relation Age of Onset    Hypertension Maternal Grandfather     Heart Attack Maternal Grandfather     Breast Cancer Other         great aunt    No Known Problems Mother         Review of Systems - History obtained from the patient  Constitutional: negative for weight loss, fever, night sweats  HEENT: negative for hearing loss, earache, congestion, snoring, sorethroat  CV: negative for chest pain, palpitations, edema  Resp: negative for cough, shortness of breath, wheezing  GI: negative for change in bowel habits, abdominal pain, black or bloody stools  : negative for frequency, dysuria, hematuria, vaginal discharge  MSK: negative for back pain, joint pain, muscle pain  Breast: negative for breast lumps, nipple discharge, galactorrhea  Skin :negative for itching, rash, hives  Neuro: negative for dizziness, headache, confusion, weakness  Psych: negative for anxiety, depression, change in mood  Heme/lymph: negative for bleeding, bruising, pallor    Physical Exam    Visit Vitals  BP (!) 161/99   Wt 149 lb 12.8 oz (67.9 kg) BMI 25.71 kg/m²       Constitutional  · Appearance: well-nourished, well developed, alert, in no acute distress    HENT  · Head and Face: appears normal    Neck  · Inspection/Palpation: normal appearance, no masses or tenderness  · Lymph Nodes: no lymphadenopathy present  · Thyroid: gland size normal, nontender, no nodules or masses present on palpation    Chest  · Respiratory Effort: breathing unlabored    Breasts  · Inspection of Breasts: breasts symmetrical, no skin changes, no discharge present, nipple appearance normal, no skin retraction present  · Palpation of Breasts and Axillae: Small mass immediatly supeior and lateral to right nipple, approx 0.5 cm, nontender  · Axillary Lymph Nodes: no lymphadenopathy present    Gastrointestinal  · Abdominal Examination: abdomen non-tender to palpation, normal bowel sounds, no masses present  · Liver and spleen: no hepatomegaly present, spleen not palpable  · Hernias: no hernias identified    Genitourinary  · External Genitalia: normal appearance for age, no discharge present, no tenderness present, no inflammatory lesions present, no masses present, no atrophy present  · Vagina: normal vaginal vault without central or paravaginal defects, no discharge present, no inflammatory lesions present, no masses present  · Bladder: non-tender to palpation  · Urethra: appears normal  · Cervix: normal, stenotic   · Uterus: normal size, shape and consistency  · Adnexa: no adnexal tenderness present, no adnexal masses present  · Perineum: perineum within normal limits, no evidence of trauma, no rashes or skin lesions present  · Anus: anus within normal limits, no hemorrhoids present  · Inguinal Lymph Nodes: no lymphadenopathy present    Skin  · General Inspection: no rash, no lesions identified    Neurologic/Psychiatric  · Mental Status:  · Orientation: grossly oriented to person, place and time  · Mood and Affect: mood normal, affect appropriate    Assessment:  Routine gynecologic examination  Her current medical status is satisfactory with no evidence of significant gynecologic issues. Right breast mass- pt will f/u with Dr. Suha Sullivan (previously removed papillomas)- offered to schedule and pt prefers to schedule herself.     Plan:  Counseled re: diet, exercise, healthy lifestyle  Return for yearly wellness visits  Rec annual mammogram

## 2021-04-02 LAB
CYTOLOGIST CVX/VAG CYTO: ABNORMAL
CYTOLOGY CVX/VAG DOC CYTO: ABNORMAL
CYTOLOGY CVX/VAG DOC THIN PREP: ABNORMAL
CYTOLOGY HISTORY:: ABNORMAL
DX ICD CODE: ABNORMAL
DX ICD CODE: ABNORMAL
HPV I/H RISK 4 DNA CVX QL PROBE+SIG AMP: NEGATIVE
Lab: ABNORMAL
OTHER STN SPEC: ABNORMAL
PATHOLOGIST CVX/VAG CYTO: ABNORMAL
STAT OF ADQ CVX/VAG CYTO-IMP: ABNORMAL

## 2021-04-06 ENCOUNTER — IMMUNIZATION (OUTPATIENT)
Dept: INTERNAL MEDICINE CLINIC | Age: 46
End: 2021-04-06
Payer: COMMERCIAL

## 2021-04-06 DIAGNOSIS — Z23 ENCOUNTER FOR IMMUNIZATION: Primary | ICD-10-CM

## 2021-04-06 PROCEDURE — 91300 COVID-19, MRNA, LNP-S, PF, 30MCG/0.3ML DOSE(PFIZER): CPT | Performed by: FAMILY MEDICINE

## 2021-04-06 PROCEDURE — 0001A COVID-19, MRNA, LNP-S, PF, 30MCG/0.3ML DOSE(PFIZER): CPT | Performed by: FAMILY MEDICINE

## 2021-04-27 ENCOUNTER — IMMUNIZATION (OUTPATIENT)
Dept: INTERNAL MEDICINE CLINIC | Age: 46
End: 2021-04-27
Payer: COMMERCIAL

## 2021-04-27 DIAGNOSIS — Z23 ENCOUNTER FOR IMMUNIZATION: Primary | ICD-10-CM

## 2021-04-27 PROCEDURE — 91300 COVID-19, MRNA, LNP-S, PF, 30MCG/0.3ML DOSE(PFIZER): CPT | Performed by: FAMILY MEDICINE

## 2021-04-27 PROCEDURE — 0002A COVID-19, MRNA, LNP-S, PF, 30MCG/0.3ML DOSE(PFIZER): CPT | Performed by: FAMILY MEDICINE

## 2022-05-09 ENCOUNTER — OFFICE VISIT (OUTPATIENT)
Dept: OBGYN CLINIC | Age: 47
End: 2022-05-09

## 2022-05-09 VITALS — SYSTOLIC BLOOD PRESSURE: 136 MMHG | BODY MASS INDEX: 24.89 KG/M2 | WEIGHT: 145 LBS | DIASTOLIC BLOOD PRESSURE: 77 MMHG

## 2022-05-09 DIAGNOSIS — Z01.419 ENCOUNTER FOR GYNECOLOGICAL EXAMINATION WITHOUT ABNORMAL FINDING: ICD-10-CM

## 2022-05-09 DIAGNOSIS — N89.8 VAGINAL DISCHARGE: Primary | ICD-10-CM

## 2022-05-09 PROCEDURE — 99396 PREV VISIT EST AGE 40-64: CPT | Performed by: OBSTETRICS & GYNECOLOGY

## 2022-05-09 RX ORDER — CONJUGATED ESTROGENS 0.62 MG/G
CREAM VAGINAL
COMMUNITY
Start: 2021-06-17

## 2022-05-09 NOTE — PROGRESS NOTES
Annual exam ages 40-58      [place] is a ,  49 y.o. female   No LMP recorded. Patient is perimenopausal.    She presents for her annual checkup. She is having significant vaginal discharge and vaginal burning. Menstrual status:    Her periods are absent. Contraception:    The current method of family planning is NA post menopause. Hormonal status:  She reports no perimenstrual type symptoms. She is not having vasomotor symptoms. The patient is not using any ERT. Sexual history:    She  reports being sexually active and has had partner(s) who are male. She reports using the following method of birth control/protection: Surgical.    Medical conditions:    Since her last annual GYN exam about one year ago, she has not the following changes in her health history: none. Surgical history confirmed with patient. has a past surgical history that includes hx cyst removal; hx salpingo-oophorectomy; hx breast biopsy (Left, 2004); and hx cyst incision and drainage (Bilateral). Pap and Mammogram History:    Her most recent Pap smear was ASCUS HPV NEG, obtained 1 year(s) ago. The patient had her mammogram today in our office. Breast Cancer History/Substance Abuse: negative      Osteoporosis History:    Family history does not include a first or second degree relative with osteopenia or osteoporosis. Past Medical History:   Diagnosis Date    Chronic urinary tract infection     Gastric ulcer     Pap smear for cervical cancer screening 16 neg HPV NEG    12 neg HPV NEG 16 neg HPV NEG     Past Surgical History:   Procedure Laterality Date    HX BREAST BIOPSY Left 2004    Cyst removed    HX CYST INCISION AND DRAINAGE Bilateral     HX CYST REMOVAL      HX SALPINGO-OOPHORECTOMY      l/s right       Current Outpatient Medications   Medication Sig Dispense Refill    lidocaine (XYLOCAINE) 5 % ointment Apply 0.007 Tubes to affected area as needed for Pain. 1 Tube 3    estradiol (ESTRACE) 0.01 % (0.1 mg/gram) vaginal cream Insert 1 g into vagina two (2) times a week. 42.5 g 5    clobetasol (TEMOVATE) 0.05 % ointment Apply  to affected area two (2) times a day. 15 g 0    lansoprazole (PREVACID) 15 mg capsule Take  by mouth Daily (before breakfast).  ondansetron (ZOFRAN ODT) 4 mg disintegrating tablet Take 1 Tab by mouth every eight (8) hours as needed for Nausea. 10 Tab 0    ALPRAZolam (XANAX) 0.25 mg tablet Take  by mouth.  sertraline (ZOLOFT) 100 mg tablet Take  by mouth daily. Allergies: Hydrocod-cpm-pe-acetaminophen     Tobacco History:  reports that she has never smoked. She has never used smokeless tobacco.  Alcohol Abuse:  reports no history of alcohol use. Drug Abuse:  reports no history of drug use. Family Medical/Cancer History:   Family History   Problem Relation Age of Onset    Hypertension Maternal Grandfather     Heart Attack Maternal Grandfather     Breast Cancer Other         great aunt    No Known Problems Mother         Review of Systems - History obtained from the patient  Constitutional: negative for weight loss, fever, night sweats  HEENT: negative for hearing loss, earache, congestion, snoring, sorethroat  CV: negative for chest pain, palpitations, edema  Resp: negative for cough, shortness of breath, wheezing  GI: negative for change in bowel habits, abdominal pain, black or bloody stools  : negative for frequency, dysuria, hematuria, vaginal discharge  MSK: negative for back pain, joint pain, muscle pain  Breast: negative for breast lumps, nipple discharge, galactorrhea  Skin :negative for itching, rash, hives  Neuro: negative for dizziness, headache, confusion, weakness  Psych: negative for anxiety, depression, change in mood  Heme/lymph: negative for bleeding, bruising, pallor    Physical Exam    There were no vitals taken for this visit.     Constitutional  · Appearance: well-nourished, well developed, alert, in no acute distress    HENT  · Head and Face: appears normal    Neck  · Inspection/Palpation: normal appearance, no masses or tenderness  · Lymph Nodes: no lymphadenopathy present  · Thyroid: gland size normal, nontender, no nodules or masses present on palpation    Chest  · Respiratory Effort: breathing unlabored    Breasts  · Inspection of Breasts: breasts symmetrical, no skin changes, no discharge present, nipple appearance normal, no skin retraction present  · Palpation of Breasts and Axillae: no masses present on palpation, no breast tenderness  · Axillary Lymph Nodes: no lymphadenopathy present    Gastrointestinal  · Abdominal Examination: abdomen non-tender to palpation, normal bowel sounds, no masses present  · Liver and spleen: no hepatomegaly present, spleen not palpable  · Hernias: no hernias identified    Genitourinary  · External Genitalia: normal appearance for age, no discharge present, no tenderness present, no inflammatory lesions present, no masses present, no atrophy present  · Vagina: normal vaginal vault without central or paravaginal defects, no discharge present, no inflammatory lesions present, no masses present  · Bladder: non-tender to palpation  · Urethra: appears normal  · Cervix: normal   · Uterus: normal size, shape and consistency  · Adnexa: no adnexal tenderness present, no adnexal masses present  · Perineum: perineum within normal limits, no evidence of trauma, no rashes or skin lesions present  · Anus: anus within normal limits, no hemorrhoids present  · Inguinal Lymph Nodes: no lymphadenopathy present    Skin  · General Inspection: no rash, no lesions identified    Neurologic/Psychiatric  · Mental Status:  · Orientation: grossly oriented to person, place and time  · Mood and Affect: mood normal, affect appropriate    Assessment:  Routine gynecologic examination  Her current medical status is satisfactory with no evidence of significant gynecologic issues.   Vaginitis- will f/u with nuswab    Plan:  Counseled re: diet, exercise, healthy lifestyle  Return for yearly wellness visits  Rec annual mammogram

## 2022-11-08 ENCOUNTER — HOSPITAL ENCOUNTER (OUTPATIENT)
Dept: PREADMISSION TESTING | Age: 47
Discharge: HOME | End: 2022-11-08
Payer: COMMERCIAL

## 2022-11-08 ENCOUNTER — ANESTHESIA EVENT (OUTPATIENT)
Dept: SURGERY | Age: 47
End: 2022-11-08
Payer: COMMERCIAL

## 2022-11-08 VITALS
RESPIRATION RATE: 12 BRPM | OXYGEN SATURATION: 98 % | SYSTOLIC BLOOD PRESSURE: 142 MMHG | HEIGHT: 64 IN | WEIGHT: 143.74 LBS | BODY MASS INDEX: 24.54 KG/M2 | TEMPERATURE: 98.2 F | DIASTOLIC BLOOD PRESSURE: 72 MMHG

## 2022-11-08 LAB
25(OH)D3 SERPL-MCNC: 35.2 NG/ML (ref 30–100)
ABO + RH BLD: NORMAL
ALBUMIN SERPL-MCNC: 3.9 G/DL (ref 3.5–5)
ALBUMIN/GLOB SERPL: 1.3 {RATIO} (ref 1.1–2.2)
ALP SERPL-CCNC: 81 U/L (ref 45–117)
ALT SERPL-CCNC: 18 U/L (ref 12–78)
ANION GAP SERPL CALC-SCNC: 4 MMOL/L (ref 5–15)
APPEARANCE UR: CLEAR
APTT PPP: 28.4 SEC (ref 22.1–31)
AST SERPL-CCNC: 14 U/L (ref 15–37)
ATRIAL RATE: 61 BPM
BACTERIA URNS QL MICRO: NEGATIVE /HPF
BASOPHILS # BLD: 0.1 K/UL (ref 0–0.1)
BASOPHILS NFR BLD: 1 % (ref 0–1)
BILIRUB SERPL-MCNC: 0.6 MG/DL (ref 0.2–1)
BILIRUB UR QL: NEGATIVE
BLOOD GROUP ANTIBODIES SERPL: NORMAL
BUN SERPL-MCNC: 13 MG/DL (ref 6–20)
BUN/CREAT SERPL: 15 (ref 12–20)
CALCIUM SERPL-MCNC: 9.3 MG/DL (ref 8.5–10.1)
CALCULATED P AXIS, ECG09: 71 DEGREES
CALCULATED R AXIS, ECG10: 62 DEGREES
CALCULATED T AXIS, ECG11: 54 DEGREES
CHLORIDE SERPL-SCNC: 105 MMOL/L (ref 97–108)
CO2 SERPL-SCNC: 32 MMOL/L (ref 21–32)
COLOR UR: NORMAL
CREAT SERPL-MCNC: 0.87 MG/DL (ref 0.55–1.02)
DIAGNOSIS, 93000: NORMAL
DIFFERENTIAL METHOD BLD: ABNORMAL
EOSINOPHIL # BLD: 0.1 K/UL (ref 0–0.4)
EOSINOPHIL NFR BLD: 2 % (ref 0–7)
EPITH CASTS URNS QL MICRO: NORMAL /LPF
ERYTHROCYTE [DISTWIDTH] IN BLOOD BY AUTOMATED COUNT: 11.7 % (ref 11.5–14.5)
EST. AVERAGE GLUCOSE BLD GHB EST-MCNC: 114 MG/DL
GLOBULIN SER CALC-MCNC: 3.1 G/DL (ref 2–4)
GLUCOSE SERPL-MCNC: 97 MG/DL (ref 65–100)
GLUCOSE UR STRIP.AUTO-MCNC: NEGATIVE MG/DL
HBA1C MFR BLD: 5.6 % (ref 4–5.6)
HCT VFR BLD AUTO: 44.1 % (ref 35–47)
HGB BLD-MCNC: 14.4 G/DL (ref 11.5–16)
HGB UR QL STRIP: NEGATIVE
HYALINE CASTS URNS QL MICRO: NORMAL /LPF (ref 0–2)
IMM GRANULOCYTES # BLD AUTO: 0 K/UL (ref 0–0.04)
IMM GRANULOCYTES NFR BLD AUTO: 1 % (ref 0–0.5)
INR PPP: 1 (ref 0.9–1.1)
KETONES UR QL STRIP.AUTO: NEGATIVE MG/DL
LEUKOCYTE ESTERASE UR QL STRIP.AUTO: NEGATIVE
LYMPHOCYTES # BLD: 1.5 K/UL (ref 0.8–3.5)
LYMPHOCYTES NFR BLD: 24 % (ref 12–49)
MCH RBC QN AUTO: 30.1 PG (ref 26–34)
MCHC RBC AUTO-ENTMCNC: 32.7 G/DL (ref 30–36.5)
MCV RBC AUTO: 92.3 FL (ref 80–99)
MONOCYTES # BLD: 0.4 K/UL (ref 0–1)
MONOCYTES NFR BLD: 6 % (ref 5–13)
NEUTS SEG # BLD: 4.2 K/UL (ref 1.8–8)
NEUTS SEG NFR BLD: 66 % (ref 32–75)
NITRITE UR QL STRIP.AUTO: NEGATIVE
NRBC # BLD: 0 K/UL (ref 0–0.01)
NRBC BLD-RTO: 0 PER 100 WBC
P-R INTERVAL, ECG05: 156 MS
PH UR STRIP: 7.5 [PH] (ref 5–8)
PLATELET # BLD AUTO: 276 K/UL (ref 150–400)
PMV BLD AUTO: 9.7 FL (ref 8.9–12.9)
POTASSIUM SERPL-SCNC: 3.9 MMOL/L (ref 3.5–5.1)
PROT SERPL-MCNC: 7 G/DL (ref 6.4–8.2)
PROT UR STRIP-MCNC: NEGATIVE MG/DL
PROTHROMBIN TIME: 10.6 SEC (ref 9–11.1)
Q-T INTERVAL, ECG07: 382 MS
QRS DURATION, ECG06: 92 MS
QTC CALCULATION (BEZET), ECG08: 384 MS
RBC # BLD AUTO: 4.78 M/UL (ref 3.8–5.2)
RBC #/AREA URNS HPF: NORMAL /HPF (ref 0–5)
SODIUM SERPL-SCNC: 141 MMOL/L (ref 136–145)
SP GR UR REFRACTOMETRY: <1.005 (ref 1–1.03)
SPECIMEN EXP DATE BLD: NORMAL
THERAPEUTIC RANGE,PTTT: NORMAL SECS (ref 58–77)
UA: UC IF INDICATED,UAUC: NORMAL
UROBILINOGEN UR QL STRIP.AUTO: 0.2 EU/DL (ref 0.2–1)
VENTRICULAR RATE, ECG03: 61 BPM
WBC # BLD AUTO: 6.3 K/UL (ref 3.6–11)
WBC URNS QL MICRO: NORMAL /HPF (ref 0–4)

## 2022-11-08 PROCEDURE — 85025 COMPLETE CBC W/AUTO DIFF WBC: CPT

## 2022-11-08 PROCEDURE — 93005 ELECTROCARDIOGRAM TRACING: CPT

## 2022-11-08 PROCEDURE — 82306 VITAMIN D 25 HYDROXY: CPT

## 2022-11-08 PROCEDURE — 83036 HEMOGLOBIN GLYCOSYLATED A1C: CPT

## 2022-11-08 PROCEDURE — 85610 PROTHROMBIN TIME: CPT

## 2022-11-08 PROCEDURE — 80053 COMPREHEN METABOLIC PANEL: CPT

## 2022-11-08 PROCEDURE — 81001 URINALYSIS AUTO W/SCOPE: CPT

## 2022-11-08 PROCEDURE — 86900 BLOOD TYPING SEROLOGIC ABO: CPT

## 2022-11-08 PROCEDURE — 36415 COLL VENOUS BLD VENIPUNCTURE: CPT

## 2022-11-08 PROCEDURE — 85730 THROMBOPLASTIN TIME PARTIAL: CPT

## 2022-11-08 RX ORDER — FAMOTIDINE 20 MG/1
20 TABLET, FILM COATED ORAL
COMMUNITY

## 2022-11-08 RX ORDER — CHOLECALCIFEROL (VITAMIN D3) 125 MCG
1 CAPSULE ORAL
COMMUNITY

## 2022-11-08 RX ORDER — PHENOL/SODIUM PHENOLATE
20 AEROSOL, SPRAY (ML) MUCOUS MEMBRANE EVERY MORNING
COMMUNITY

## 2022-11-08 RX ORDER — GABAPENTIN 300 MG/1
300 CAPSULE ORAL
COMMUNITY

## 2022-11-08 NOTE — PERIOP NOTES
[address] [address] [facility]   MAIN OR                                  [phone]   MAIN PRE OP                          [phone]                                                                                AMBULATORY PRE OP          [phone]  PRE-ADMISSION TESTING    [phone]     Surgery Date:  11-9-2022 Wednesday       Is surgery arrival time given by surgeon? NO  If NO, [facility] staff will call you between 3 and 7pm the day before your surgery with your arrival time. (If your surgery is on a Monday, we will call you the Friday before.)    Call [phone] after 7pm Monday-Friday if you did not receive this call. INSTRUCTIONS BEFORE YOUR SURGERY   When You  Arrive Arrive at the [address] [facility] on the day of your surgery  Have your insurance card, photo ID, and any copayment (if needed)   Food   and   Drink NO food or drink after midnight the night before surgery    This means NO water, gum, mints, coffee, juice, etc.  No alcohol (beer, wine, liquor) 24 hours before and after surgery   Medications to   TAKE   Morning of Surgery MEDICATIONS TO TAKE THE MORNING OF SURGERY WITH A SIP OF WATER:   Prilosec   Medications  To  STOP      7 days before surgery Non-Steroidal anti-inflammatory Drugs (NSAID's): for example, Ibuprofen (Advil, Motrin), Naproxen (Aleve)  Aspirin, if taking for pain   Herbal supplements, vitamins, and fish oil  Other:  (Pain medications not listed above, including Tylenol may be taken)   Blood  Thinners If you take  Aspirin, Plavix, Coumadin, or any blood-thinning or anti-blood clot medicine, talk to the doctor who prescribed the medications for pre-operative instructions.    Bathing Clothing  Jewelry  Valuables     If you shower the morning of surgery, please do not apply anything to your skin (lotions, powders, deodorant, or makeup, especially mascara)  Follow Chlorhexidine Care Fusion body wash instructions provided to you during PAT appointment. Begin 3 days prior to surgery. Do not shave or trim anywhere 24 hours before surgery  Wear your hair loose or down; no pony-tails, buns, or metal hair clips  Wear loose, comfortable, clean clothes  Wear glasses instead of contacts  Leave money, valuables, and jewelry, including body piercings, at home     Going Home - or Spending the Night SAME-DAY SURGERY: You must have a responsible adult drive you home and stay with you 24 hours after surgery  ADMITS: If your doctor is keeping you in the hospital after surgery, leave personal belongings/luggage in your car until you have a hospital room number. Hospital discharge time is 12 noon  Drivers must be here before 12 noon unless you are told differently   Special Instructions    Bring updated med list, incentive spirometer and walker day of surgery. Email Modesta Cook to let her know you completed online class     Follow all instructions so your surgery wont be cancelled. Please, be on time. If a situation occurs and you are delayed the day of surgery, call [phone] or [phone]. If your physical condition changes (like a fever, cold, flu, etc.) call your surgeon. Home medication(s) reviewed and verified verbally with list during PAT appointment. The patient was contacted in person. The patient verbalizes understanding of all instructions and does not need reinforcement.

## 2022-11-09 ENCOUNTER — HOSPITAL ENCOUNTER (OUTPATIENT)
Age: 47
Setting detail: OBSERVATION
Discharge: HOME | End: 2022-11-10
Attending: ORTHOPAEDIC SURGERY | Admitting: ORTHOPAEDIC SURGERY
Payer: COMMERCIAL

## 2022-11-09 ENCOUNTER — APPOINTMENT (OUTPATIENT)
Dept: GENERAL RADIOLOGY | Age: 47
End: 2022-11-09
Attending: ORTHOPAEDIC SURGERY
Payer: COMMERCIAL

## 2022-11-09 ENCOUNTER — ANESTHESIA (OUTPATIENT)
Dept: SURGERY | Age: 47
End: 2022-11-09
Payer: COMMERCIAL

## 2022-11-09 DIAGNOSIS — Z98.1 S/P CERVICAL SPINAL FUSION: Primary | ICD-10-CM

## 2022-11-09 PROBLEM — M48.02 CERVICAL STENOSIS OF SPINE: Status: ACTIVE | Noted: 2022-11-09

## 2022-11-09 LAB
BACTERIA SPEC CULT: NORMAL
BACTERIA SPEC CULT: NORMAL
SERVICE CMNT-IMP: NORMAL

## 2022-11-09 PROCEDURE — 77030005513 HC CATH URETH FOL11 MDII -B: Performed by: ORTHOPAEDIC SURGERY

## 2022-11-09 PROCEDURE — 77030028271 HC SRGFL HEMSTAT MTRX KT J&J -C: Performed by: ORTHOPAEDIC SURGERY

## 2022-11-09 PROCEDURE — 76010000173 HC OR TIME 3 TO 3.5 HR INTENSV-TIER 1: Performed by: ORTHOPAEDIC SURGERY

## 2022-11-09 PROCEDURE — 77030033138 HC SUT PGA STRATFX J&J -B: Performed by: ORTHOPAEDIC SURGERY

## 2022-11-09 PROCEDURE — 77030002996 HC SUT SLK J&J -A: Performed by: ORTHOPAEDIC SURGERY

## 2022-11-09 PROCEDURE — G0378 HOSPITAL OBSERVATION PER HR: HCPCS

## 2022-11-09 PROCEDURE — 77030026438 HC STYL ET INTUB CARD -A: Performed by: ANESTHESIOLOGY

## 2022-11-09 PROCEDURE — 74011000250 HC RX REV CODE- 250: Performed by: ORTHOPAEDIC SURGERY

## 2022-11-09 PROCEDURE — 77030042509 HC BIT DRL -C: Performed by: ORTHOPAEDIC SURGERY

## 2022-11-09 PROCEDURE — 74011000250 HC RX REV CODE- 250: Performed by: NURSE PRACTITIONER

## 2022-11-09 PROCEDURE — 74011250636 HC RX REV CODE- 250/636: Performed by: NURSE ANESTHETIST, CERTIFIED REGISTERED

## 2022-11-09 PROCEDURE — 74011250636 HC RX REV CODE- 250/636: Performed by: ORTHOPAEDIC SURGERY

## 2022-11-09 PROCEDURE — 77030039147 HC PWDR HEMSTS SURGICEL JNJ -D: Performed by: ORTHOPAEDIC SURGERY

## 2022-11-09 PROCEDURE — 77030031139 HC SUT VCRL2 J&J -A: Performed by: ORTHOPAEDIC SURGERY

## 2022-11-09 PROCEDURE — 76060000037 HC ANESTHESIA 3 TO 3.5 HR: Performed by: ORTHOPAEDIC SURGERY

## 2022-11-09 PROCEDURE — C1713 ANCHOR/SCREW BN/BN,TIS/BN: HCPCS | Performed by: ORTHOPAEDIC SURGERY

## 2022-11-09 PROCEDURE — 77030038552 HC DRN WND MDII -A: Performed by: ORTHOPAEDIC SURGERY

## 2022-11-09 PROCEDURE — 77030037806 HC GRFT SPNG OSTEOAMP BTUS -I: Performed by: ORTHOPAEDIC SURGERY

## 2022-11-09 PROCEDURE — 77030008684 HC TU ET CUF COVD -B: Performed by: ANESTHESIOLOGY

## 2022-11-09 PROCEDURE — 2709999900 HC NON-CHARGEABLE SUPPLY: Performed by: ORTHOPAEDIC SURGERY

## 2022-11-09 PROCEDURE — 74011250637 HC RX REV CODE- 250/637: Performed by: NURSE PRACTITIONER

## 2022-11-09 PROCEDURE — 77030016441 HC APPL CLP LIG1 J&J -B: Performed by: ORTHOPAEDIC SURGERY

## 2022-11-09 PROCEDURE — 74011250636 HC RX REV CODE- 250/636: Performed by: ANESTHESIOLOGY

## 2022-11-09 PROCEDURE — 76210000006 HC OR PH I REC 0.5 TO 1 HR: Performed by: ORTHOPAEDIC SURGERY

## 2022-11-09 PROCEDURE — C1889 IMPLANT/INSERT DEVICE, NOC: HCPCS | Performed by: ORTHOPAEDIC SURGERY

## 2022-11-09 PROCEDURE — 77030038222 HC BUR MIDSREX MEDT -D: Performed by: ORTHOPAEDIC SURGERY

## 2022-11-09 PROCEDURE — 74011000250 HC RX REV CODE- 250: Performed by: NURSE ANESTHETIST, CERTIFIED REGISTERED

## 2022-11-09 PROCEDURE — C1762 CONN TISS, HUMAN(INC FASCIA): HCPCS | Performed by: ORTHOPAEDIC SURGERY

## 2022-11-09 PROCEDURE — 77030010507 HC ADH SKN DERMBND J&J -B: Performed by: ORTHOPAEDIC SURGERY

## 2022-11-09 PROCEDURE — 74011250636 HC RX REV CODE- 250/636: Performed by: NURSE PRACTITIONER

## 2022-11-09 DEVICE — ADVANCED BONE GRAFT SUBSTITUTE 5CC
Type: IMPLANTABLE DEVICE | Site: SPINE CERVICAL | Status: FUNCTIONAL
Brand: NANOSS ABGS

## 2022-11-09 DEVICE — IMPLANTABLE DEVICE: Type: IMPLANTABLE DEVICE | Site: SPINE CERVICAL | Status: FUNCTIONAL

## 2022-11-09 DEVICE — SCREW SPNL SD 4X14 MM VA FOR ANTR CERV PLATE SYS CERVALIGN: Type: IMPLANTABLE DEVICE | Site: SPINE CERVICAL | Status: FUNCTIONAL

## 2022-11-09 DEVICE — CAGE SPNL W17XH6MM D145MM 6DEG ANT CERV INTBDY FUS LORD W: Type: IMPLANTABLE DEVICE | Site: SPINE CERVICAL | Status: FUNCTIONAL

## 2022-11-09 DEVICE — ALLOGRAFT BNE MOLD 2 CC VIABLE BNE MTRX VIBONE: Type: IMPLANTABLE DEVICE | Site: SPINE CERVICAL | Status: FUNCTIONAL

## 2022-11-09 DEVICE — GRAFT BNE SUB XL W20XH7XL50MM COMPRESSIBLE SPNG STRP PROVIDE: Type: IMPLANTABLE DEVICE | Site: SPINE CERVICAL | Status: FUNCTIONAL

## 2022-11-09 RX ORDER — ONDANSETRON 2 MG/ML
4 INJECTION INTRAMUSCULAR; INTRAVENOUS AS NEEDED
Status: DISCONTINUED | OUTPATIENT
Start: 2022-11-09 | End: 2022-11-09 | Stop reason: HOSPADM

## 2022-11-09 RX ORDER — SODIUM CHLORIDE, SODIUM LACTATE, POTASSIUM CHLORIDE, CALCIUM CHLORIDE 600; 310; 30; 20 MG/100ML; MG/100ML; MG/100ML; MG/100ML
INJECTION, SOLUTION INTRAVENOUS
Status: DISCONTINUED | OUTPATIENT
Start: 2022-11-09 | End: 2022-11-09 | Stop reason: HOSPADM

## 2022-11-09 RX ORDER — NALOXONE HYDROCHLORIDE 0.4 MG/ML
0.2 INJECTION, SOLUTION INTRAMUSCULAR; INTRAVENOUS; SUBCUTANEOUS
Status: DISCONTINUED | OUTPATIENT
Start: 2022-11-09 | End: 2022-11-09 | Stop reason: HOSPADM

## 2022-11-09 RX ORDER — SODIUM CHLORIDE, SODIUM LACTATE, POTASSIUM CHLORIDE, CALCIUM CHLORIDE 600; 310; 30; 20 MG/100ML; MG/100ML; MG/100ML; MG/100ML
125 INJECTION, SOLUTION INTRAVENOUS CONTINUOUS
Status: DISCONTINUED | OUTPATIENT
Start: 2022-11-09 | End: 2022-11-09 | Stop reason: HOSPADM

## 2022-11-09 RX ORDER — SUCCINYLCHOLINE CHLORIDE 20 MG/ML
INJECTION INTRAMUSCULAR; INTRAVENOUS AS NEEDED
Status: DISCONTINUED | OUTPATIENT
Start: 2022-11-09 | End: 2022-11-09 | Stop reason: HOSPADM

## 2022-11-09 RX ORDER — LIDOCAINE HYDROCHLORIDE 20 MG/ML
INJECTION, SOLUTION EPIDURAL; INFILTRATION; INTRACAUDAL; PERINEURAL AS NEEDED
Status: DISCONTINUED | OUTPATIENT
Start: 2022-11-09 | End: 2022-11-09 | Stop reason: HOSPADM

## 2022-11-09 RX ORDER — DEXAMETHASONE SODIUM PHOSPHATE 4 MG/ML
INJECTION, SOLUTION INTRA-ARTICULAR; INTRALESIONAL; INTRAMUSCULAR; INTRAVENOUS; SOFT TISSUE AS NEEDED
Status: DISCONTINUED | OUTPATIENT
Start: 2022-11-09 | End: 2022-11-09 | Stop reason: HOSPADM

## 2022-11-09 RX ORDER — LIDOCAINE HYDROCHLORIDE 10 MG/ML
0.1 INJECTION, SOLUTION EPIDURAL; INFILTRATION; INTRACAUDAL; PERINEURAL AS NEEDED
Status: DISCONTINUED | OUTPATIENT
Start: 2022-11-09 | End: 2022-11-09 | Stop reason: HOSPADM

## 2022-11-09 RX ORDER — HYDROMORPHONE HYDROCHLORIDE 1 MG/ML
.5-1 INJECTION, SOLUTION INTRAMUSCULAR; INTRAVENOUS; SUBCUTANEOUS
Status: DISCONTINUED | OUTPATIENT
Start: 2022-11-09 | End: 2022-11-09 | Stop reason: HOSPADM

## 2022-11-09 RX ORDER — DEXAMETHASONE SODIUM PHOSPHATE 10 MG/ML
10 INJECTION INTRAMUSCULAR; INTRAVENOUS EVERY 8 HOURS
Status: COMPLETED | OUTPATIENT
Start: 2022-11-09 | End: 2022-11-10

## 2022-11-09 RX ORDER — HYDROMORPHONE HYDROCHLORIDE 1 MG/ML
0.5 INJECTION, SOLUTION INTRAMUSCULAR; INTRAVENOUS; SUBCUTANEOUS
Status: DISCONTINUED | OUTPATIENT
Start: 2022-11-09 | End: 2022-11-10 | Stop reason: HOSPADM

## 2022-11-09 RX ORDER — FENTANYL CITRATE 50 UG/ML
INJECTION, SOLUTION INTRAMUSCULAR; INTRAVENOUS AS NEEDED
Status: DISCONTINUED | OUTPATIENT
Start: 2022-11-09 | End: 2022-11-09 | Stop reason: HOSPADM

## 2022-11-09 RX ORDER — PROCHLORPERAZINE EDISYLATE 5 MG/ML
5 INJECTION INTRAMUSCULAR; INTRAVENOUS
Status: DISCONTINUED | OUTPATIENT
Start: 2022-11-09 | End: 2022-11-10 | Stop reason: HOSPADM

## 2022-11-09 RX ORDER — PROPOFOL 10 MG/ML
INJECTION, EMULSION INTRAVENOUS AS NEEDED
Status: DISCONTINUED | OUTPATIENT
Start: 2022-11-09 | End: 2022-11-09 | Stop reason: HOSPADM

## 2022-11-09 RX ORDER — FLUMAZENIL 0.1 MG/ML
0.2 INJECTION INTRAVENOUS
Status: DISCONTINUED | OUTPATIENT
Start: 2022-11-09 | End: 2022-11-09 | Stop reason: HOSPADM

## 2022-11-09 RX ORDER — SODIUM CHLORIDE 0.9 % (FLUSH) 0.9 %
5-40 SYRINGE (ML) INJECTION AS NEEDED
Status: DISCONTINUED | OUTPATIENT
Start: 2022-11-09 | End: 2022-11-09 | Stop reason: HOSPADM

## 2022-11-09 RX ORDER — KETOROLAC TROMETHAMINE 30 MG/ML
30 INJECTION, SOLUTION INTRAMUSCULAR; INTRAVENOUS EVERY 6 HOURS
Status: DISCONTINUED | OUTPATIENT
Start: 2022-11-09 | End: 2022-11-10 | Stop reason: HOSPADM

## 2022-11-09 RX ORDER — ROCURONIUM BROMIDE 10 MG/ML
INJECTION, SOLUTION INTRAVENOUS AS NEEDED
Status: DISCONTINUED | OUTPATIENT
Start: 2022-11-09 | End: 2022-11-09 | Stop reason: HOSPADM

## 2022-11-09 RX ORDER — ONDANSETRON 2 MG/ML
4 INJECTION INTRAMUSCULAR; INTRAVENOUS
Status: DISCONTINUED | OUTPATIENT
Start: 2022-11-09 | End: 2022-11-10 | Stop reason: HOSPADM

## 2022-11-09 RX ORDER — PANTOPRAZOLE SODIUM 40 MG/1
40 TABLET, DELAYED RELEASE ORAL
Status: DISCONTINUED | OUTPATIENT
Start: 2022-11-09 | End: 2022-11-10 | Stop reason: HOSPADM

## 2022-11-09 RX ORDER — SODIUM CHLORIDE 9 MG/ML
125 INJECTION, SOLUTION INTRAVENOUS CONTINUOUS
Status: DISCONTINUED | OUTPATIENT
Start: 2022-11-09 | End: 2022-11-10 | Stop reason: HOSPADM

## 2022-11-09 RX ORDER — TRAMADOL HYDROCHLORIDE 50 MG/1
50 TABLET ORAL
Status: DISCONTINUED | OUTPATIENT
Start: 2022-11-09 | End: 2022-11-10 | Stop reason: HOSPADM

## 2022-11-09 RX ORDER — SODIUM CHLORIDE, SODIUM LACTATE, POTASSIUM CHLORIDE, CALCIUM CHLORIDE 600; 310; 30; 20 MG/100ML; MG/100ML; MG/100ML; MG/100ML
100 INJECTION, SOLUTION INTRAVENOUS CONTINUOUS
Status: DISCONTINUED | OUTPATIENT
Start: 2022-11-09 | End: 2022-11-09 | Stop reason: HOSPADM

## 2022-11-09 RX ORDER — PANTOPRAZOLE SODIUM 40 MG/1
40 TABLET, DELAYED RELEASE ORAL 2 TIMES DAILY
Status: DISCONTINUED | OUTPATIENT
Start: 2022-11-09 | End: 2022-11-09

## 2022-11-09 RX ORDER — ALPRAZOLAM 0.5 MG/1
0.5 TABLET ORAL
Status: DISCONTINUED | OUTPATIENT
Start: 2022-11-09 | End: 2022-11-10 | Stop reason: HOSPADM

## 2022-11-09 RX ORDER — MELATONIN
2000
Status: DISCONTINUED | OUTPATIENT
Start: 2022-11-09 | End: 2022-11-10 | Stop reason: HOSPADM

## 2022-11-09 RX ORDER — ESTRADIOL 0.1 MG/G
1 CREAM VAGINAL 2 TIMES WEEKLY
Status: DISCONTINUED | OUTPATIENT
Start: 2022-11-11 | End: 2022-11-10 | Stop reason: HOSPADM

## 2022-11-09 RX ORDER — DIPHENHYDRAMINE HYDROCHLORIDE 50 MG/ML
12.5 INJECTION, SOLUTION INTRAMUSCULAR; INTRAVENOUS
Status: DISCONTINUED | OUTPATIENT
Start: 2022-11-09 | End: 2022-11-10 | Stop reason: HOSPADM

## 2022-11-09 RX ORDER — PROPOFOL 10 MG/ML
INJECTION, EMULSION INTRAVENOUS
Status: DISCONTINUED | OUTPATIENT
Start: 2022-11-09 | End: 2022-11-09 | Stop reason: HOSPADM

## 2022-11-09 RX ORDER — SODIUM CHLORIDE 0.9 % (FLUSH) 0.9 %
5-40 SYRINGE (ML) INJECTION EVERY 8 HOURS
Status: DISCONTINUED | OUTPATIENT
Start: 2022-11-09 | End: 2022-11-10 | Stop reason: HOSPADM

## 2022-11-09 RX ORDER — PANTOPRAZOLE SODIUM 40 MG/1
40 TABLET, DELAYED RELEASE ORAL DAILY
Status: DISCONTINUED | OUTPATIENT
Start: 2022-11-10 | End: 2022-11-09

## 2022-11-09 RX ORDER — FACIAL-BODY WIPES
10 EACH TOPICAL DAILY PRN
Status: DISCONTINUED | OUTPATIENT
Start: 2022-11-11 | End: 2022-11-10 | Stop reason: HOSPADM

## 2022-11-09 RX ORDER — KETOROLAC TROMETHAMINE 30 MG/ML
30 INJECTION, SOLUTION INTRAMUSCULAR; INTRAVENOUS
Status: COMPLETED | OUTPATIENT
Start: 2022-11-09 | End: 2022-11-09

## 2022-11-09 RX ORDER — GABAPENTIN 300 MG/1
300 CAPSULE ORAL
Status: DISCONTINUED | OUTPATIENT
Start: 2022-11-09 | End: 2022-11-10 | Stop reason: HOSPADM

## 2022-11-09 RX ORDER — POLYETHYLENE GLYCOL 3350 17 G/17G
17 POWDER, FOR SOLUTION ORAL DAILY
Status: DISCONTINUED | OUTPATIENT
Start: 2022-11-10 | End: 2022-11-10 | Stop reason: HOSPADM

## 2022-11-09 RX ORDER — EPHEDRINE SULFATE/0.9% NACL/PF 50 MG/5 ML
SYRINGE (ML) INTRAVENOUS AS NEEDED
Status: DISCONTINUED | OUTPATIENT
Start: 2022-11-09 | End: 2022-11-09 | Stop reason: HOSPADM

## 2022-11-09 RX ORDER — AMOXICILLIN 250 MG
1 CAPSULE ORAL 2 TIMES DAILY
Status: DISCONTINUED | OUTPATIENT
Start: 2022-11-09 | End: 2022-11-10 | Stop reason: HOSPADM

## 2022-11-09 RX ORDER — MIDAZOLAM HYDROCHLORIDE 1 MG/ML
INJECTION, SOLUTION INTRAMUSCULAR; INTRAVENOUS AS NEEDED
Status: DISCONTINUED | OUTPATIENT
Start: 2022-11-09 | End: 2022-11-09 | Stop reason: HOSPADM

## 2022-11-09 RX ORDER — OXYCODONE HYDROCHLORIDE 5 MG/1
10 TABLET ORAL
Status: DISCONTINUED | OUTPATIENT
Start: 2022-11-09 | End: 2022-11-10 | Stop reason: HOSPADM

## 2022-11-09 RX ORDER — SODIUM CHLORIDE 0.9 % (FLUSH) 0.9 %
5-40 SYRINGE (ML) INJECTION AS NEEDED
Status: DISCONTINUED | OUTPATIENT
Start: 2022-11-09 | End: 2022-11-10 | Stop reason: HOSPADM

## 2022-11-09 RX ORDER — PHENYLEPHRINE HCL IN 0.9% NACL 0.4MG/10ML
SYRINGE (ML) INTRAVENOUS AS NEEDED
Status: DISCONTINUED | OUTPATIENT
Start: 2022-11-09 | End: 2022-11-09 | Stop reason: HOSPADM

## 2022-11-09 RX ORDER — ONDANSETRON 2 MG/ML
INJECTION INTRAMUSCULAR; INTRAVENOUS AS NEEDED
Status: DISCONTINUED | OUTPATIENT
Start: 2022-11-09 | End: 2022-11-09 | Stop reason: HOSPADM

## 2022-11-09 RX ORDER — OXYCODONE HYDROCHLORIDE 5 MG/1
5 TABLET ORAL
Status: DISCONTINUED | OUTPATIENT
Start: 2022-11-09 | End: 2022-11-10 | Stop reason: HOSPADM

## 2022-11-09 RX ORDER — SODIUM CHLORIDE 0.9 % (FLUSH) 0.9 %
5-40 SYRINGE (ML) INJECTION EVERY 8 HOURS
Status: DISCONTINUED | OUTPATIENT
Start: 2022-11-09 | End: 2022-11-09 | Stop reason: HOSPADM

## 2022-11-09 RX ORDER — CYCLOBENZAPRINE HCL 10 MG
10 TABLET ORAL
Status: DISCONTINUED | OUTPATIENT
Start: 2022-11-09 | End: 2022-11-10 | Stop reason: HOSPADM

## 2022-11-09 RX ORDER — SERTRALINE HYDROCHLORIDE 50 MG/1
25 TABLET, FILM COATED ORAL
Status: DISCONTINUED | OUTPATIENT
Start: 2022-11-09 | End: 2022-11-10 | Stop reason: HOSPADM

## 2022-11-09 RX ORDER — ACETAMINOPHEN 500 MG
1000 TABLET ORAL EVERY 6 HOURS
Status: DISCONTINUED | OUTPATIENT
Start: 2022-11-09 | End: 2022-11-10 | Stop reason: HOSPADM

## 2022-11-09 RX ORDER — NALOXONE HYDROCHLORIDE 0.4 MG/ML
0.4 INJECTION, SOLUTION INTRAMUSCULAR; INTRAVENOUS; SUBCUTANEOUS AS NEEDED
Status: DISCONTINUED | OUTPATIENT
Start: 2022-11-09 | End: 2022-11-10 | Stop reason: HOSPADM

## 2022-11-09 RX ADMIN — SENNOSIDES AND DOCUSATE SODIUM 1 TABLET: 50; 8.6 TABLET ORAL at 17:45

## 2022-11-09 RX ADMIN — Medication 40 MCG: at 10:25

## 2022-11-09 RX ADMIN — FENTANYL CITRATE 50 MCG: 50 INJECTION, SOLUTION INTRAMUSCULAR; INTRAVENOUS at 08:02

## 2022-11-09 RX ADMIN — Medication 40 MCG: at 09:54

## 2022-11-09 RX ADMIN — ROCURONIUM BROMIDE 5 MG: 10 INJECTION INTRAVENOUS at 08:00

## 2022-11-09 RX ADMIN — KETOROLAC TROMETHAMINE 30 MG: 30 INJECTION, SOLUTION INTRAMUSCULAR at 12:22

## 2022-11-09 RX ADMIN — SUCCINYLCHOLINE CHLORIDE 100 MG: 20 INJECTION, SOLUTION INTRAMUSCULAR; INTRAVENOUS at 08:06

## 2022-11-09 RX ADMIN — FENTANYL CITRATE 50 MCG: 50 INJECTION, SOLUTION INTRAMUSCULAR; INTRAVENOUS at 08:05

## 2022-11-09 RX ADMIN — SODIUM CHLORIDE 125 ML/HR: 9 INJECTION, SOLUTION INTRAVENOUS at 12:46

## 2022-11-09 RX ADMIN — MIDAZOLAM HYDROCHLORIDE 2 MG: 1 INJECTION, SOLUTION INTRAMUSCULAR; INTRAVENOUS at 08:00

## 2022-11-09 RX ADMIN — Medication 10 MG: at 08:41

## 2022-11-09 RX ADMIN — ONDANSETRON HYDROCHLORIDE 4 MG: 2 SOLUTION INTRAMUSCULAR; INTRAVENOUS at 11:00

## 2022-11-09 RX ADMIN — SODIUM CHLORIDE, POTASSIUM CHLORIDE, SODIUM LACTATE AND CALCIUM CHLORIDE 125 ML/HR: 600; 310; 30; 20 INJECTION, SOLUTION INTRAVENOUS at 07:26

## 2022-11-09 RX ADMIN — HYDROMORPHONE HYDROCHLORIDE 0.5 MG: 1 INJECTION, SOLUTION INTRAMUSCULAR; INTRAVENOUS; SUBCUTANEOUS at 11:56

## 2022-11-09 RX ADMIN — Medication 40 MCG: at 10:07

## 2022-11-09 RX ADMIN — CEFAZOLIN 2 G: 1 INJECTION, POWDER, FOR SOLUTION INTRAMUSCULAR; INTRAVENOUS at 17:44

## 2022-11-09 RX ADMIN — ACETAMINOPHEN 1000 MG: 500 TABLET ORAL at 17:45

## 2022-11-09 RX ADMIN — CYCLOBENZAPRINE 10 MG: 10 TABLET, FILM COATED ORAL at 17:49

## 2022-11-09 RX ADMIN — ACETAMINOPHEN 1000 MG: 500 TABLET ORAL at 13:06

## 2022-11-09 RX ADMIN — TRAMADOL HYDROCHLORIDE 50 MG: 50 TABLET ORAL at 14:22

## 2022-11-09 RX ADMIN — FENTANYL CITRATE 50 MCG: 50 INJECTION, SOLUTION INTRAMUSCULAR; INTRAVENOUS at 11:30

## 2022-11-09 RX ADMIN — ROCURONIUM BROMIDE 45 MG: 10 INJECTION INTRAVENOUS at 09:00

## 2022-11-09 RX ADMIN — DEXAMETHASONE SODIUM PHOSPHATE 10 MG: 10 INJECTION, SOLUTION INTRAMUSCULAR; INTRAVENOUS at 14:22

## 2022-11-09 RX ADMIN — PROPOFOL 75 MCG/KG/MIN: 10 INJECTION, EMULSION INTRAVENOUS at 08:15

## 2022-11-09 RX ADMIN — GABAPENTIN 300 MG: 300 CAPSULE ORAL at 21:12

## 2022-11-09 RX ADMIN — Medication 40 MCG: at 09:42

## 2022-11-09 RX ADMIN — ONDANSETRON 4 MG: 2 INJECTION INTRAMUSCULAR; INTRAVENOUS at 21:34

## 2022-11-09 RX ADMIN — SUGAMMADEX 200 MG: 100 INJECTION, SOLUTION INTRAVENOUS at 11:05

## 2022-11-09 RX ADMIN — PROPOFOL 160 MG: 10 INJECTION, EMULSION INTRAVENOUS at 08:05

## 2022-11-09 RX ADMIN — SODIUM CHLORIDE, PRESERVATIVE FREE 10 ML: 5 INJECTION INTRAVENOUS at 17:46

## 2022-11-09 RX ADMIN — DEXAMETHASONE SODIUM PHOSPHATE 8 MG: 4 INJECTION, SOLUTION INTRAMUSCULAR; INTRAVENOUS at 08:16

## 2022-11-09 RX ADMIN — ROCURONIUM BROMIDE 10 MG: 10 INJECTION INTRAVENOUS at 10:03

## 2022-11-09 RX ADMIN — Medication 2000 UNITS: at 20:53

## 2022-11-09 RX ADMIN — Medication 80 MCG: at 09:31

## 2022-11-09 RX ADMIN — SENNOSIDES AND DOCUSATE SODIUM 1 TABLET: 50; 8.6 TABLET ORAL at 13:08

## 2022-11-09 RX ADMIN — SODIUM CHLORIDE, POTASSIUM CHLORIDE, SODIUM LACTATE AND CALCIUM CHLORIDE: 600; 310; 30; 20 INJECTION, SOLUTION INTRAVENOUS at 08:10

## 2022-11-09 RX ADMIN — CEFAZOLIN SODIUM 2 G: 1 POWDER, FOR SOLUTION INTRAMUSCULAR; INTRAVENOUS at 08:32

## 2022-11-09 RX ADMIN — OXYCODONE 5 MG: 5 TABLET ORAL at 13:05

## 2022-11-09 RX ADMIN — FENTANYL CITRATE 50 MCG: 50 INJECTION, SOLUTION INTRAMUSCULAR; INTRAVENOUS at 11:21

## 2022-11-09 RX ADMIN — ALPRAZOLAM 0.5 MG: 0.5 TABLET ORAL at 21:12

## 2022-11-09 RX ADMIN — PANTOPRAZOLE SODIUM 40 MG: 40 TABLET, DELAYED RELEASE ORAL at 21:12

## 2022-11-09 RX ADMIN — SODIUM CHLORIDE, POTASSIUM CHLORIDE, SODIUM LACTATE AND CALCIUM CHLORIDE: 600; 310; 30; 20 INJECTION, SOLUTION INTRAVENOUS at 08:00

## 2022-11-09 RX ADMIN — DEXAMETHASONE SODIUM PHOSPHATE 10 MG: 10 INJECTION, SOLUTION INTRAMUSCULAR; INTRAVENOUS at 21:12

## 2022-11-09 RX ADMIN — PANTOPRAZOLE SODIUM 40 MG: 40 TABLET, DELAYED RELEASE ORAL at 12:22

## 2022-11-09 RX ADMIN — HYDROMORPHONE HYDROCHLORIDE 0.5 MG: 1 INJECTION, SOLUTION INTRAMUSCULAR; INTRAVENOUS; SUBCUTANEOUS at 11:41

## 2022-11-09 RX ADMIN — LIDOCAINE HYDROCHLORIDE 80 MG: 20 INJECTION, SOLUTION EPIDURAL; INFILTRATION; INTRACAUDAL; PERINEURAL at 08:00

## 2022-11-09 RX ADMIN — SODIUM CHLORIDE, PRESERVATIVE FREE 10 ML: 5 INJECTION INTRAVENOUS at 21:12

## 2022-11-09 RX ADMIN — ONDANSETRON 4 MG: 2 INJECTION INTRAMUSCULAR; INTRAVENOUS at 16:04

## 2022-11-09 RX ADMIN — KETOROLAC TROMETHAMINE 30 MG: 30 INJECTION, SOLUTION INTRAMUSCULAR; INTRAVENOUS at 17:44

## 2022-11-09 NOTE — ANESTHESIA POSTPROCEDURE EVALUATION
Procedure(s):  C5-7 ANTERIOR CERVICAL DISCECTOMY FUSION. general    Anesthesia Post Evaluation        Patient location during evaluation: PACU  Patient participation: complete - patient participated  Level of consciousness: awake  Pain management: adequate  Airway patency: patent  Anesthetic complications: no  Cardiovascular status: acceptable and stable  Respiratory status: acceptable and unassisted  Hydration status: acceptable  Comments: The patient was seen and evaluated in the post-operative period. The time of my evaluation may not match the time of this note. The patient denied uncontrolled pain or nausea, and there were no significant complications evident. John Hanson MD      Post anesthesia nausea and vomiting:  none  Final Post Anesthesia Temperature Assessment:  Normothermia (36.0-37.5 degrees C)      INITIAL Post-op Vital signs:   Vitals Value Taken Time   /90 11/09/22 1210   Temp 36.4 °C (97.6 °F) 11/09/22 1134   Pulse 105 11/09/22 1216   Resp 16 11/09/22 1216   SpO2 99 % 11/09/22 1216   Vitals shown include unvalidated device data.

## 2022-11-09 NOTE — H&P
Date of Surgery Update:  Scot Sweeney was seen and examined. History and physical has been reviewed. The patient has been examined. There have been no significant clinical changes since the completion of the originally dated History and Physical.  Past Medical History:   Diagnosis Date    Adverse effect of anesthesia     early awakening    Anxiety     Chronic urinary tract infection     resolved 2012    Gastric ulcer     GERD (gastroesophageal reflux disease)     History of palpitations 2021    after taking cold medicine, had cardiac w/u which was negative-now resolved    Pap smear for cervical cancer screening 07/19/2016 1/23/12 neg HPV NEG 7/19/16 neg HPV NEG     No current facility-administered medications on file prior to encounter. Current Outpatient Medications on File Prior to Encounter   Medication Sig Dispense Refill    ALPRAZolam (XANAX) 0.25 mg tablet Take 0.5 mg by mouth nightly. sertraline (ZOLOFT) 100 mg tablet Take 25 mg by mouth nightly. estradiol (ESTRACE) 0.01 % (0.1 mg/gram) vaginal cream Insert 1 g into vagina two (2) times a week. 42.5 g 5     Allergies   Allergen Reactions    Hydrocod-Cpm-Pe-Acetaminophen Other (comments)     Unknown reaction per pt       Patient identified by surgeon; surgical site was confirmed by patient and surgeon. Patient reports axial pain 7, radiating pain right arm to hand  Numbness bilateral arms and hands, greater on right than left  Weakness left C6 WE 4/5, right tricep 3/5  Assistive device for ambulation no  Vit D: 35.2      I re-reviewed risks and benefits of surgery today, reviewing CC5-6 and C67-7 2 level acdf for stenosis C5-7 and bilateral arm weaness,  expected hospital course, and patient wishes to proceed with surgery.          Signed By: Steven Trent MD     November 9, 2022 8:07 AM

## 2022-11-09 NOTE — PROGRESS NOTES
Problem: Falls - Risk of  Goal: *Absence of Falls  Description: Document Kashif Ryan Fall Risk and appropriate interventions in the flowsheet.   Outcome: Progressing Towards Goal  Note: Fall Risk Interventions:            Medication Interventions: Assess postural VS orthostatic hypotension, Teach patient to arise slowly, Evaluate medications/consider consulting pharmacy

## 2022-11-09 NOTE — OP NOTES
Operative Note    Patient: Darylene Chew  YOB: 1975  MRN: [id number]    Date of Procedure: 11/9/2022     Pre-Op Diagnosis: Cervical radiculitis [M54.12]  Muscle weakness (generalized) [M62.81]  Cervical pain [M54.2]  Numbness [R20.0]  Cervical stenosis of spinal canal [M48.02]  Neural foraminal stenosis of cervical spine [M48.02]  Cervical myelopathy (HCC) [G95.9]    Post-Op Diagnosis: Same as preoperative diagnosis. Procedure(s):  C5-7 ANTERIOR CERVICAL DISCECTOMY FUSION    Surgeon(s):  Moisés Alarcon MD    Surgical Assistant: Nurse Practitioner: Liu Oneill NP    Anesthesia: General     Estimated Blood Loss (mL):  Minimal    Complications: None    Specimens: * No specimens in log *     Implants:   Implant Name Type Inv.  Item Serial No.  Lot No. LRB No. Used Action   GRAFT BNE SUB XL [id number] COMPRESSIBLE SPNG STRP PROVIDE - [id number]  GRAFT BNE SUB XL [id number] COMPRESSIBLE SPNG STRP PROVIDE [id number] Intean Poalroath Rongroeurng_WD [id number] N/A 1 Implanted   GRAFT BONE SUBSTITUTE 5CC BIOACTIVE NANOSS - SN/A  GRAFT BONE SUBSTITUTE 5CC BIOACTIVE NANOSS N/A [employer] INC_WD [id number] N/A 1 Implanted   ViBone Moldable 2cc   [id number] [employer] N/A N/A 1 Implanted   CAGE SPNL [id number] D145MM 6DEG ANT CERV INTBDY FUS LORD W - SN/A  CAGE SPNL [id number] D145MM 6DEG ANT CERV INTBDY FUS LORD W N/A [employer] [id number] N/A 1 Implanted   CAGE SPNL [id number] D145MM 6DEG ANT CERV INTBDY FUS LORD W - SN/A  CAGE SPNL [id number] D145MM 6DEG ANT CERV INTBDY FUS LORD W N/A [employer] [id number] N/A 1 Implanted   PLATE SPNL 2 LEVEL STD 28 MM ANTR CERV CERVALIGN - SN/A  PLATE SPNL 2 LEVEL STD 28 MM ANTR CERV CERVALIGN N/A [employer] N/A N/A 1 Implanted   SCREW SPNL SD 4X14 MM VA FOR ANTR CERV PLATE SYS CERVALIGN - SN/A  SCREW SPNL SD 4X14 MM VA FOR ANTR CERV PLATE SYS CERVALIGN N/A [employer] N/A N/A 6 Implanted       Drains:   Hemovac Anterior; Left Neck (Active)   Site Assessment Clean, dry, & intact 11/09/22 1042   Dressing Status Clean, dry, & intact 11/09/22 1042   Drainage Description Serosanguinous 11/09/22 1042   Status Patent; Charged;Draining 11/09/22 1042       Findings: severe stenosis with cord compression C5-6 and C6-7. Detailed Description of Procedure:     OPERATIVE REPORT      NAME: Tam Cerrato    AGE: 49 y.o. YOB: 1975    MEDICAL RECORD NUMBER: [id number]    DATE OF SURGERY: 11/9/2022  [place] [facility] [place]     PREOPERATIVE DIAGNOSIS: cervical stenosis, myelopathy, cervical radiculopathy         POSTOPERATIVE DIAGNOSIS: cervical stenosis, myelopathy, cervical radiculopathy        OPERATIVE PROCEDURE:   -C5-7anterior cervical diskectomy for decompression of spinal cord and bilateral exiting nerve roots. -C5-7anterior cervical interbody fusion  - insertion of structural interbody cage C5-6, size 6 mm large footprint,  -insertion of structural interbody cage C6-7, size 6 mm large footprint  -Anterior cervical instrumentation with plating applied to anterior aspect of C5-7 vertebral bodies Surgalign cervalign plate with 14 mm screws x 6  -Morselized allograft -osteoamp sponge,nanoss, vibone        SURGEON: Kody Boudreaux MD     ASSISTANT: Yovani Boss NP was present and scrubbed for the entire procedure and assisted with retractors, suction, exposure, graft preparation, instrumentation preparation and wound closure.        ANESTHESIA: General    COMPLICATIONS: None    ESTIMATED BLOOD LOSS: minimal    INSTRUMENTATION: Surgalign tetrafuse cage x 2, Cervalign plate    NEUROMONITORING: SSEPs , MEP's and spontaneous EMGs    INDICATION FOR PROCEDURE:  The patient is a very pleasant 49 y.o. female with cervical stenosis and left WE weakness, Right tricep weakness and refractory neck pain, bilateral arm numbness and mostly radiating right arm pain. . Patient has failed a trial of conservative measures . Preop xrays demonstrated C5-7 spondylosis and MRI has demonstrated severe stenosis with cord flattening at both levels, also enlarged central canal in the cord at T1-T2. .The patient elected to proceed with operative intervention. She was aware of the risks, benefits, and alternatives. Have re-reviewed realistic expectations regarding symptom improvement. We have re-reviewed risks and benefits of anterior cervical diskectomy and fusion  including bleeding, infection, neurological injury such as paralysis, chronic difficulty with swallowing or voice changes, pseudoarthrosis or failure to fuse, adjacent segment disease above and below levels of fusion, realistic expectations regarding symptom improvement , possible worsening of symptoms, failure of surgery to reverse preoperative neurologic deficits or symptoms, hardware failure, scar tissue as well as other unforeseen medical and anesthetic complications including but not limited to blood clots, heart attack, stroke respiratory failure or death. We have also discussed risks associated with allograft and autograft. After understanding risks and benefits the patient wishes to proceed with surgery. She provided informed consent. PROCEDURE: The patient was identified in the preoperative holding area. The anterior cervical spine was marked by me. SCD's applied in preop. Immediately preop patient demonstrated left WE weakness and right tricep weakness, reporting bilateral hand numbness . She was transferred to the operating room where general anesthesia was given, anesthesia was instructed to use neutral spine intubation and to maintain blood pressure for spinal cord perfusion. Neuromonitoring electrodes applied, baselines obtained, noted to be lower amplitude on the left. She was also given perioperative antibiotic, Ancef.  The patient was placed supine on the operating room table with head supported on donut pillow. All bony prominences were well-padded including cubital and carpal tunnels, iliac crests knees and ankles. The shoulders were taped. Shoulder roll was applied. The anterior cervical spine was prepped and draped in the usual standard fashion. We performed a surgical time-out. I made a skin incision on the left side. It was transverse. I exposed the anterior cervical spine with standard anterior pritchett manzano approach staying medial to carotid sheath and lateral to trachea-esophageal complex. Prevertebral fascia was identified and split with scissors and spread cephalad and caudal with peanut elevators. I placed a needle into the disk space to verify our level. I exposed the disc spaces with handheld retractors, penfield elevator underneath the longus colli muscles and bipolar cautery from uncus to uncus. Removal of anterior osteophytes with rongeur and preliminary diskectomy performed. Self retaining retractor placed, endotracheal tube cuff deflated and then reinflated to decrease risks of recurrent laryngeal nerve problems. Tension was released periodically in retractor and tissues moistened with saline periodically throughout surgery. I brought in the operating room microscope. I performed a diskectomy at C6-7,  I decompressed the spinal cord and nerve roots bilaterally. Severe central and right greater than left foraminal stenosis, central cord compression from spondylotic bar which was thinned with leslie and removed with kerrison. PLL taken down with 1 mm kerrison and micronerve hook. I prepared the endplates to bleeding bone. We had good hemostasis. I prepared the endplates of G0-4 to bleeding bone. I performed trial sizing at C6-7.  I placed a structural interbody cage into C6-7 packed with morsellized allograft and local autograft bone with the appropriate amount of tension and alignment,    I performed a diskectomy at C5-6,  I decompressed the spinal cord and nerve roots bilaterally. Again severe spondyosis with right sided cord compression, foraminal stenosis worse left than right, both severe. PLL taken down with 1 mm kerrison and micronerve hook. The PLL quite calcified on the right hand side. I prepared the endplates to bleeding bone. We had good hemostasis. I prepared the endplates of L4-3 to bleeding bone. I performed trial sizing at C5-6. I placed a structural interbody cage into CC5-6 packed with morsellized allograft and local autograft bone with the appropriate amount of tension and alignment,         I then placed a separate anterior cervical plate onto W3-8  anterior vertebral body surface. The plate was contoured to facilitate lordotic alignment. The screws were locked to the plates according to the manufacture's specification. Final flouroscopic imaging demonstrated safe position of hardware and interbodies. We had good hemostasis. I copiously irrigated the entire wound. I placed a deep drain. Trachea-esophageal complex and carotid sheath inspected and no evidence of injury. The wound was closed with 3-0 Vicryl and 4-0 Stratafix. A sterile dressing was applied. Neuromonitoring improved from baseline at end of case with increased amplitude in LUE SSEP. Other unchanged. The patient was extubated and transferred to the recovery room in good medical condition. Gentry Daley NP was present and scrubbed for the entire procedure and assisted with retractors, suction, exposure, graft preparation, instrumentation preparation and wound closure. I, Dr. Franca Cruz, performed the above procedures.      Franca Cruz MD  11/9/2022      Electronically Signed by Franca Cruz MD on 11/9/2022 at 11:06 AM

## 2022-11-09 NOTE — PERIOP NOTES
TRANSFER - OUT REPORT:    Verbal report given to Dino RN (name) on Yue Mendiola  being transferred to [address] (unit) for routine post - op       Report consisted of patients Situation, Background, Assessment and   Recommendations(SBAR). Information from the following report(s) SBAR, Kardex, OR Summary, Procedure Summary, Intake/Output, MAR, Accordion, Recent Results, Med Rec Status, and Cardiac Rhythm NSR  was reviewed with the receiving nurse. Lines:   Peripheral IV 11/09/22 Anterior;Left;Proximal Forearm (Active)   Site Assessment Clean, dry, & intact 11/09/22 1305   Phlebitis Assessment 0 11/09/22 1305   Infiltration Assessment 0 11/09/22 1305   Dressing Status Clean, dry, & intact 11/09/22 1305   Dressing Type Transparent;Tape 11/09/22 1305   Hub Color/Line Status Pink;Capped 11/09/22 1305   Action Taken Dressing reinforced 11/09/22 1305   Alcohol Cap Used No 11/09/22 1305       Peripheral IV 11/09/22 Right Hand (Active)   Site Assessment Clean, dry, & intact 11/09/22 1305   Phlebitis Assessment 0 11/09/22 1305   Infiltration Assessment 0 11/09/22 1305   Dressing Status Clean, dry, & intact 11/09/22 1305   Dressing Type Transparent;Tape 11/09/22 1305   Hub Color/Line Status Pink; Infusing;Patent 11/09/22 1305   Action Taken Open ports on tubing capped 11/09/22 1305   Alcohol Cap Used Yes 11/09/22 1305        Opportunity for questions and clarification was provided.       Patient transported with:   O2 @ 2 liters  Registered Nurse

## 2022-11-09 NOTE — PROGRESS NOTES
Patient seen in recovery room  preop right arm pain improved along with N/T  Extubated uneventfully  Postop pain heightened, currently being managed    Patient Vitals for the past 4 hrs:   Temp Pulse Resp BP SpO2   11/09/22 1200 -- 99 12 (!) 148/83 100 %   11/09/22 1155 -- (!) 101 10 (!) 147/80 100 %   11/09/22 1150 -- (!) 106 14 (!) 153/84 100 %   11/09/22 1145 -- (!) 104 16 (!) 147/82 100 %   11/09/22 1140 -- (!) 105 14 (!) 152/82 100 %   11/09/22 1135 -- (!) 102 12 (!) 149/72 99 %   11/09/22 1134 97.6 °F (36.4 °C) 100 13 (!) 149/76 100 %   11/09/22 1130 -- (!) 105 19 (!) 149/76 100 %   11/09/22 1127 97.6 °F (36.4 °C) 99 16 (!) 149/76 99 %       Following commands  Dressing clean and dry  hemovac in place and functioning with minimal output  Strong motor RUE and LUE, RLE and LLE (exception of 4/5 right tricep)  Sensation grossly intact to LT     Stable postop ACDF C5-7  -discussed surgery with family, answered questions  -periop antibiotics  -SCD's  -advance diet: clear liquids, full liquids, regular (easy to chew diet)  -mobilize  -pain control: adding PACU toradol dose  -plan d/c home tomorrow      José Manuel Oconnor NP

## 2022-11-09 NOTE — PERIOP NOTES
TRANSFER - OUT REPORT:    Verbal report given to Zion Balderrama RN (name) on Timothy Sims  being transferred to 4th Floor - Room 404 (unit) for routine post - op       Report consisted of patients Situation, Background, Assessment and   Recommendations(SBAR). Information from the following report(s) SBAR, Kardex, OR Summary, Procedure Summary, Intake/Output, MAR, Accordion, Recent Results, Med Rec Status, and Cardiac Rhythm NSR  was reviewed with the receiving nurse. Lines:   Peripheral IV 11/09/22 Anterior;Left;Proximal Forearm (Active)   Site Assessment Clean, dry, & intact 11/09/22 1305   Phlebitis Assessment 0 11/09/22 1305   Infiltration Assessment 0 11/09/22 1305   Dressing Status Clean, dry, & intact 11/09/22 1305   Dressing Type Transparent;Tape 11/09/22 1305   Hub Color/Line Status Pink;Capped 11/09/22 1305   Action Taken Dressing reinforced 11/09/22 1305   Alcohol Cap Used No 11/09/22 1305       Peripheral IV 11/09/22 Right Hand (Active)   Site Assessment Clean, dry, & intact 11/09/22 1305   Phlebitis Assessment 0 11/09/22 1305   Infiltration Assessment 0 11/09/22 1305   Dressing Status Clean, dry, & intact 11/09/22 1305   Dressing Type Transparent;Tape 11/09/22 1305   Hub Color/Line Status Pink; Infusing;Patent 11/09/22 1305   Action Taken Open ports on tubing capped 11/09/22 1305   Alcohol Cap Used Yes 11/09/22 1305        Opportunity for questions and clarification was provided.       Patient transported with:   O2 @ 2 liters  Registered Nurse

## 2022-11-09 NOTE — ANESTHESIA PREPROCEDURE EVALUATION
Relevant Problems   No relevant active problems       Anesthetic History   No history of anesthetic complications            Review of Systems / Medical History  Patient summary reviewed and pertinent labs reviewed    Pulmonary  Within defined limits                 Neuro/Psych         Psychiatric history  Pertinent negatives: No seizures, TIA and CVA   Cardiovascular                Pertinent negatives: No past MI, angina and CHF  Exercise tolerance: >4 METS     GI/Hepatic/Renal     GERD      PUD  Pertinent negatives: No renal disease   Endo/Other  Within defined limits        Pertinent negatives: No diabetes   Other Findings              Physical Exam    Airway  Mallampati: II  TM Distance: < 4 cm  Neck ROM: decreased range of motion   Mouth opening: Normal     Cardiovascular      Rate: normal         Dental    Dentition: Upper dentition intact and Lower dentition intact     Pulmonary  Breath sounds clear to auscultation               Abdominal  GI exam deferred       Other Findings            Anesthetic Plan    ASA: 2  Anesthesia type: general          Induction: Intravenous  Anesthetic plan and risks discussed with: Patient      RAZIA video laryngoscope

## 2022-11-10 VITALS
OXYGEN SATURATION: 97 % | WEIGHT: 141.76 LBS | HEIGHT: 64 IN | RESPIRATION RATE: 18 BRPM | HEART RATE: 99 BPM | SYSTOLIC BLOOD PRESSURE: 152 MMHG | DIASTOLIC BLOOD PRESSURE: 80 MMHG | BODY MASS INDEX: 24.2 KG/M2 | TEMPERATURE: 99.4 F

## 2022-11-10 PROCEDURE — 74011250636 HC RX REV CODE- 250/636: Performed by: NURSE PRACTITIONER

## 2022-11-10 PROCEDURE — 74011250637 HC RX REV CODE- 250/637: Performed by: NURSE PRACTITIONER

## 2022-11-10 PROCEDURE — 74011000250 HC RX REV CODE- 250: Performed by: NURSE PRACTITIONER

## 2022-11-10 PROCEDURE — G0378 HOSPITAL OBSERVATION PER HR: HCPCS

## 2022-11-10 PROCEDURE — 77030041075 HC DRSG AG OPTIFRM MDII -B

## 2022-11-10 RX ORDER — ACETAMINOPHEN 500 MG
TABLET ORAL
Qty: 100 TABLET | Refills: 0 | Status: SHIPPED | OUTPATIENT
Start: 2022-11-10 | End: 2022-11-29

## 2022-11-10 RX ORDER — TRAMADOL HYDROCHLORIDE 50 MG/1
50-100 TABLET ORAL
Qty: 56 TABLET | Refills: 0 | Status: SHIPPED | OUTPATIENT
Start: 2022-11-10 | End: 2022-11-17

## 2022-11-10 RX ORDER — DIAZEPAM 5 MG/1
5 TABLET ORAL
Qty: 20 TABLET | Refills: 0 | Status: SHIPPED | OUTPATIENT
Start: 2022-11-10

## 2022-11-10 RX ORDER — DIAZEPAM 5 MG/1
5 TABLET ORAL
Status: DISCONTINUED | OUTPATIENT
Start: 2022-11-10 | End: 2022-11-10 | Stop reason: HOSPADM

## 2022-11-10 RX ADMIN — KETOROLAC TROMETHAMINE 30 MG: 30 INJECTION, SOLUTION INTRAMUSCULAR; INTRAVENOUS at 06:29

## 2022-11-10 RX ADMIN — SENNOSIDES AND DOCUSATE SODIUM 1 TABLET: 50; 8.6 TABLET ORAL at 10:04

## 2022-11-10 RX ADMIN — SODIUM CHLORIDE, PRESERVATIVE FREE 10 ML: 5 INJECTION INTRAVENOUS at 06:31

## 2022-11-10 RX ADMIN — PANTOPRAZOLE SODIUM 40 MG: 40 TABLET, DELAYED RELEASE ORAL at 06:29

## 2022-11-10 RX ADMIN — KETOROLAC TROMETHAMINE 30 MG: 30 INJECTION, SOLUTION INTRAMUSCULAR; INTRAVENOUS at 00:08

## 2022-11-10 RX ADMIN — CEFAZOLIN 2 G: 1 INJECTION, POWDER, FOR SOLUTION INTRAMUSCULAR; INTRAVENOUS at 00:08

## 2022-11-10 RX ADMIN — ACETAMINOPHEN 1000 MG: 500 TABLET ORAL at 06:29

## 2022-11-10 RX ADMIN — TRAMADOL HYDROCHLORIDE 50 MG: 50 TABLET ORAL at 10:48

## 2022-11-10 RX ADMIN — DEXAMETHASONE SODIUM PHOSPHATE 10 MG: 10 INJECTION, SOLUTION INTRAMUSCULAR; INTRAVENOUS at 06:29

## 2022-11-10 RX ADMIN — POLYETHYLENE GLYCOL 3350 17 G: 17 POWDER, FOR SOLUTION ORAL at 10:04

## 2022-11-10 RX ADMIN — ACETAMINOPHEN 1000 MG: 500 TABLET ORAL at 00:08

## 2022-11-10 RX ADMIN — SODIUM CHLORIDE 125 ML/HR: 9 INJECTION, SOLUTION INTRAVENOUS at 00:08

## 2022-11-10 NOTE — PROGRESS NOTES
.I have reviewed discharge instructions with the patient and spouse. The patient and spouse verbalized understanding.

## 2022-11-10 NOTE — PROGRESS NOTES
11/10/2022  8:32 AM  CM consult for [facility] submitted in error. No CM needs.      Care Management Interventions  Support Systems: Spouse/Significant Other  Discharge Location  Patient Expects to be Discharged to[de-identified] Home with family assistance

## 2022-11-10 NOTE — PROGRESS NOTES
Medicare Outpatient Observation Notice (MOON)/ [place] Outpatient Observation Notice (Arline Backers) provided to patient/representative with verbal explanation of the notice. Time allotted for questions regarding the notice. Patient /representative provided a completed copy of the MOON/VOON notice. Copy placed on bedside chart.   Eulogio Smith CMS

## 2022-11-10 NOTE — DISCHARGE SUMMARY
Orthopedic Service Discharge Summary    Patient ID:  Timothy Sims  [id number]  female  49 y.o.  1975    Admit date: 11/9/2022    Discharge date and time: 11/10/2022 11:23 AM     Admitting Physician: Saul Guaman MD     Discharge Physician: Saul Guaman MD    Consulting Physician(s): Treatment Team: Utilization Review: Dirk Thomas RN; Care Manager: Nika Blanco    Date of Surgery: 11/9/2022     Preoperative Diagnosis:  Cervical radiculitis [M54.12]  Muscle weakness (generalized) [M62.81]  Cervical pain [M54.2]  Numbness [R20.0]  Cervical stenosis of spinal canal [M48.02]  Neural foraminal stenosis of cervical spine [M48.02]  Cervical myelopathy ([address] [G95.9]    Postoperative Diagnosis: Cervical radiculitis [M54.12]  Muscle weakness (generalized) [M62.81]  Cervical pain [M54.2]  Numbness [R20.0]  Cervical stenosis of spinal canal [M48.02]  Neural foraminal stenosis of cervical spine [M48.02]  Cervical myelopathy ([address] [G95.9]    Procedure(s): Procedure(s):  C5-7 ANTERIOR CERVICAL DISCECTOMY FUSION    Surgeon: Surgeon(s) and Role:     * Quyen Deluna MD - Primary      Anesthesia:  General    Preoperative Medical Clearance:                           HPI:  Pt is a 49 y.o. female who has a history of Cervical radiculitis [M54.12]  Muscle weakness (generalized) [M62.81]  Cervical pain [M54.2]  Numbness [R20.0]  Cervical stenosis of spinal canal [M48.02]  Neural foraminal stenosis of cervical spine [M48.02]  Cervical myelopathy (HCC) [G95.9]  Cervical stenosis of spine [M48.02]  with pain and limitations of activities of daily living who presents at this time with Patient reports axial pain 7, radiating pain right arm to hand  Numbness bilateral arms and hands, greater on right than left  Weakness left C6 WE 4/5, right tricep 3/5  She failed to achieve relief despite conservative management and wishes to proceed with surgery.      PMH:   Past Medical History:   Diagnosis Date    Adverse effect of anesthesia     early awakening    Anxiety     Chronic urinary tract infection     resolved 2012    Gastric ulcer     GERD (gastroesophageal reflux disease)     History of palpitations 2021    after taking cold medicine, had cardiac w/u which was negative-now resolved    Pap smear for cervical cancer screening 07/19/2016 1/23/12 neg HPV NEG 7/19/16 neg HPV NEG       Medications upon admission :   Prior to Admission Medications   Prescriptions Last Dose Informant Patient Reported? Taking? ALPRAZolam (XANAX) 0.25 mg tablet 11/8/2022 at 2045  Yes Yes   Sig: Take 0.5 mg by mouth nightly. Omeprazole delayed release (PRILOSEC D/R) 20 mg tablet 11/7/2022  Yes No   Sig: Take 20 mg by mouth Every morning. cholecalciferol, vitamin D3, 50 mcg (2,000 unit) tab 11/7/2022  Yes No   Sig: Take 1 Tablet by mouth nightly. estradiol (ESTRACE) 0.01 % (0.1 mg/gram) vaginal cream 11/6/2022  No No   Sig: Insert 1 g into vagina two (2) times a week. famotidine (PEPCID) 20 mg tablet 11/8/2022  Yes Yes   Sig: Take 20 mg by mouth nightly as needed for Heartburn. 1-2 tab prn hs   gabapentin (NEURONTIN) 300 mg capsule 11/8/2022 at 1900  Yes Yes   Sig: Take 300 mg by mouth nightly. sertraline (ZOLOFT) 100 mg tablet 11/8/2022 at 2045  Yes Yes   Sig: Take 25 mg by mouth nightly. Facility-Administered Medications: None        Allergies: Allergies   Allergen Reactions    Hydrocod-Cpm-Pe-Acetaminophen Other (comments)     Unknown reaction per pt        Hospital Course:  Hospital Course: The patient underwent surgery. Complications:  None; patient tolerated the procedure well. Was taken to the PACU in stable condition and then transferred orthopedic unit. POD1: She did well overnight, no complications. Pain well controlled. She advanced her diet from clear liquids, full liquids, to regular diet without complication. She has been passing flatus. She has walked to the bedside commode multiple times.  Hemovac drain removed and dressing changed. Reports improvement in preop arm pain. She continues to have slight weakness in right triceps. She is cleared for discharge. Patient instructions given. Perioperative Antibiotics:  Ancef     Postoperative Pain Management:  Acetaminophen and Oxycodone, decadron, toradol    DVT Prophylaxis: SCD's    Postoperative transfusions:     none     Post Op complications: none    Hemoglobin at discharge:    Lab Results   Component Value Date/Time    HGB 14.4 11/08/2022 08:34 AM    INR 1.0 11/08/2022 08:34 AM     Discharged to: Home. Discharge instructions:  -See Full Summary of discharge instructions attached  -Anticoagulate with none  -Resume pre hospital diet            -Resume home medications   Discharge Medication List as of 11/10/2022 10:26 AM        START taking these medications    Details   diazePAM (VALIUM) 5 mg tablet Take 1 Tablet by mouth every six (6) hours as needed for Anxiety or Muscle Spasm(s). Max Daily Amount: 20 mg., Normal, Disp-20 Tablet, R-0      traMADoL (ULTRAM) 50 mg tablet Take 1-2 Tablets by mouth every six (6) hours as needed for Pain for up to 7 days. Max Daily Amount: 400 mg., Normal, Disp-56 Tablet, R-0      acetaminophen (TYLENOL) 500 mg tablet Take 2 Tablets by mouth every eight (8) hours for 7 days, THEN 2 Tablets every twelve (12) hours for 7 days, THEN 2 Tablets every eight (8) hours for 5 days. , Normal, Disp-100 Tablet, R-0Pharmacist, Please add the following text to the patient instructi ons, \"Take as needed for pain in addition to any other medications ordered for pain relief. \"           CONTINUE these medications which have NOT CHANGED    Details   gabapentin (NEURONTIN) 300 mg capsule Take 300 mg by mouth nightly., Historical Med      famotidine (PEPCID) 20 mg tablet Take 20 mg by mouth nightly as needed for Heartburn.  1-2 tab prn hs, Historical Med      ALPRAZolam (XANAX) 0.25 mg tablet Take 0.5 mg by mouth nightly., Historical Med      sertraline (ZOLOFT) 100 mg tablet Take 25 mg by mouth nightly., Historical Med      Omeprazole delayed release (PRILOSEC D/R) 20 mg tablet Take 20 mg by mouth Every morning., Historical Med      cholecalciferol, vitamin D3, 50 mcg (2,000 unit) tab Take 1 Tablet by mouth nightly., Historical Med      estradiol (ESTRACE) 0.01 % (0.1 mg/gram) vaginal cream Insert 1 g into vagina two (2) times a week., Normal, Disp-42.5 g, R-5          per medical continuation form  -Discharge activity: Activity as tolerated, Ambulate in house, No driving while on analgesics, and See surgical instructions  -Ambulate with None  -Wound Care Keep wound clean and dry, Reinforce dressing PRN, Ice to area for comfort and As directed  -Follow up in office in the next few weeks      Signed:  Lemuel Doyle NP  11/21/2022  8:18 AM        No att. providers found

## 2022-11-10 NOTE — DISCHARGE INSTRUCTIONS
[facility]ody Staples, [address]  Office Phone: [phone]  Neck Surgery Discharge Instructions    Activities  You are going home a well person, be as active as possible. Your only exercise should be walking. Start with short frequent walks and increase your walking distance each day. Start with walking twice a day for 5 minutes and increase your distance each day 2-3 minutes until you reach 25 minutes twice a day. Limit the amount of time you sit to 20-30 minute intervals. Sitting for prolonged periods of time will be uncomfortable for you following your surgery. Do not lift anything over 8-10 pounds (about 1 gallon of milk), and do not do any bending or straining. Avoid reaching overhead in this post-operative period  Do not do any neck exercises until you have been instructed by your doctor. When you are in the bed, you may lay on your back or on either side. Do not lie on your stomach. Continue using your incentive spirometer regularly for deep breathing exercises  You may resume sexual relations 2-3 weeks after your surgery    Diet  You may resume your normal diet. If your throat is sore, you may want to eat soft foods for a few days. Generally, foods should be soft and moist, avoid very dry or brittle foods (crackers, fried foods). Be sure to drink plenty of fluids, it is important to keep yourself hydrated. If you begin having trouble swallowing, call the office immediately. Avoid alcoholic beverages and ABSOLUTELY NO tobacco products. Tobacco products will interfere with your healing. If you continue to use tobacco, you may end up needing another surgery in the future. To support your fusion, nutrition is important. You should consume approximately 60-80 grams of lean protein daily, 5,000-10,000 mg Vit D3, 7351-1723 mg Calcium per day. Medications  Do not take anti-inflammatory medications or aspirin unless instructed by your physician.   Take your pain medication as directed. Do NOT take additional Tylenol if your prescribed pain medication has acetaminophen in it (Endocet/Percocet, Lortab, Norco). You may sub  It is important to have regular bowel movements. Pain medications may cause constipation. Stool softeners, prune juice, and increasing your water and fiber intake may help in preventing constipation. Do NOT take laxatives if at all possible except in severe situations. It can results in a vicious cycle of constipation and diarrhea. Do not be alarmed if you still have some of the same symptoms you had prior to surgery. The nerves often require time to heal after the pressure has been relieved. You may experience pain in your shoulders or between your shoulder blades, which is common after this surgery. The level of pain you experience should improve as your body heals. Driving  You may not drive or return to work until instructed by your physician. However, you may ride in the car for short periods of time. Neck collar  Wear your neck brace. You may remove it for short breaks, when eating or showering. You must keep the brace on while sleeping and when ambulating. Showering  You may shower from shoulders down once you go home, wait for approximately 5 days after your surgery to shower head to toe and only if your incision is not draining. You may remove your brace during showers. Do not rub or apply lotion or ointments to the incision site. Do not use tub baths, swimming pools or Jacuzzis. Caring for your incision  Keep the dressing on until 7 days after surgery. At that point, if the incision is dry and without drainage, you may keep the wound open to air without cover. You may have steri-strips on your incision (small, white pieces of tape). Do not pull the steri-strips; they will fall off on their own after several days. If you have sutures or staples, they will be removed by home health or when you see your physician.   Do not rub or apply any lotions or ointments to your incision site. Follow Up  Once you are home, call your physicians office to schedule an appointment 2-3 weeks after surgery with Danielle Mayorga NP    Notify your physician if you develop any of the following conditions:  Fever above 101 degrees for 24 hours. Nausea or vomiting. Severe headache. Inability to urinate. Loss of bowel or bladder control (sudden onset of incontinence). Changes in sensation in your extremities (numbness, tingling, loss of color). Severe pain or pain not relieved by medications. Redness, swelling, or drainage from your incision. Persistent pain in the chest.   Pain in the calf of either leg. Increased weakness (if this is greater than before your surgery).

## 2022-11-10 NOTE — PROGRESS NOTES
ORTHOPAEDIC CERVICAL FUSION PROGRESS NOTE    NAME:     Tam Cerrato   :       1975   MRN:       [id number]   DATE:      11/10/2022    POD:              1 Day Post-Op  S/P:              Procedure(s):  C5-7 ANTERIOR CERVICAL DISCECTOMY FUSION    SUBJECTIVE:  C/O sore throat   Reports improvement in preop right arm pain  Postop pain controlled   Tolerating PO intake   Ambulatory: no difficulty getting OOB  Denies nausea/vomiting, headache, chest pain or shortness of breath    Recent Labs     22  0834   HGB 14.4   HCT 44.1   INR 1.0      K 3.9      CO2 32   BUN 13   CREA 0.87   GLU 97     Patient Vitals for the past 12 hrs:   BP Temp Pulse Resp SpO2   11/10/22 0445 (!) 160/85 98.1 °F (36.7 °C) 82 18 --   11/10/22 0120 (!) 165/92 -- 83 -- --   11/10/22 0024 (!) 180/86 97.5 °F (36.4 °C) 81 18 97 %     Exam:  Positive strength/ROM bilat upper ext.: Right tricep 4/5  Neuro intact to sensation  Dressings clean and dry  VISTA collar inplace / intact  Hemovac: minimal    PLAN: 1 Day Post-Op, improved   Continue PO pain medications as needed -- valium added for anxiety/muscle spasms  Continue SCD's, frequent ambulation  Advance to regular diet: easy to chew  Continue cervical orthosis  Remove drain and change dressing to optifoam  Continue Vit D3  D/C to home today  D/C Rx on chart     Yovani Boss NP

## 2022-11-10 NOTE — PROGRESS NOTES
Contacted Kadi Koroma NP:    Pt is requesting something for anxiety. She has Xanax scheduled at bedtime but nothing prn. Can something be ordered for her? Thanks.

## 2023-07-31 NOTE — PATIENT INSTRUCTIONS
Patient Education        Pelvic Exam: Care Instructions  Overview     When your doctor checks your pelvic organs, it's called a pelvic exam. This exam is done to evaluate symptoms, such as pelvic pain or abnormal vaginal bleeding and discharge. It may also be done to collect samples of cells for cervical cancer screening. Before your exam, it's important to share some information with your doctor. You can talk about any concerns you may have. Your doctor will also want to know if you are pregnant or use birth control. And your doctor will want to hear about any problems, surgeries, or procedures you have had in your pelvic area. You will also need to tell your doctor when your last period was. Be sure to tell your doctor if there is anything that can be done to help you feel safe during the exam.  Follow-up care is a key part of your treatment and safety. Be sure to make and go to all appointments, and call your doctor if you are having problems. It's also a good idea to know your test results and keep a list of the medicines you take. How is a pelvic exam done? During a pelvic exam, you will: Take off your clothes below the waist. You will get a paper or cloth cover to put over the lower half of your body. Lie on your back on an exam table with your feet and legs supported by footrests. The doctor may:  Put on gloves and check the opening of your vagina for sores or swelling. Gently put a tool called a speculum into your vagina. It opens the vagina a little bit. You may feel some pressure. The speculum lets your doctor see inside the vagina. Use a small brush, spatula, or swab to get a sample for testing. The doctor then removes the speculum. Put one or two gloved fingers of one hand into your vagina. The other hand goes on your lower belly. This lets your doctor feel your pelvic organs. You will probably feel some pressure. Put one gloved finger into your rectum and one into your vagina, if needed.  This

## 2023-08-04 ENCOUNTER — OFFICE VISIT (OUTPATIENT)
Age: 48
End: 2023-08-04
Payer: COMMERCIAL

## 2023-08-04 VITALS — DIASTOLIC BLOOD PRESSURE: 88 MMHG | BODY MASS INDEX: 24.03 KG/M2 | SYSTOLIC BLOOD PRESSURE: 120 MMHG | WEIGHT: 140 LBS

## 2023-08-04 DIAGNOSIS — Z01.419 ENCOUNTER FOR GYNECOLOGICAL EXAMINATION WITHOUT ABNORMAL FINDING: Primary | ICD-10-CM

## 2023-08-04 PROCEDURE — 99396 PREV VISIT EST AGE 40-64: CPT | Performed by: OBSTETRICS & GYNECOLOGY

## 2023-08-04 NOTE — PROGRESS NOTES
Stella Winchester is a 51 y.o. female returns for an annual exam     Chief Complaint   Patient presents with    Annual Exam       No LMP recorded. Patient is postmenopausal.  Her periods are absent. Problems: no problems  Birth Control: post menopausal status. Last Pap: ascus, neg hpv, obtained 2 year(s) ago on 3/29/21. She does not have a history of OMARI 2, 3 or cervical cancer. Last Mammogram: She had a mammogram 8/1/23 that was normal.       1. Have you been to the ER, urgent care clinic, or hospitalized since your last visit? No    2. Have you seen or consulted any other health care providers outside of the [address] [place] [address] Avenue since your last visit?  Yes    Examination chaperoned by Bhupendra Kerr MA.
palpable  Hernias: no hernias identified    Genitourinary  External Genitalia: normal appearance for age, no discharge present, no tenderness present, no inflammatory lesions present, no masses present, no atrophy present  Vagina: normal vaginal vault without central or paravaginal defects, no discharge present, no inflammatory lesions present, no masses present  Bladder: non-tender to palpation  Urethra: appears normal  Cervix: normal   Uterus: normal size, shape and consistency  Adnexa: no adnexal tenderness present, no adnexal masses present  Perineum: perineum within normal limits, no evidence of trauma, no rashes or skin lesions present  Anus: anus within normal limits, no hemorrhoids present  Inguinal Lymph Nodes: no lymphadenopathy present    Skin  General Inspection: no rash, no lesions identified    Neurologic/Psychiatric  Mental Status:  Orientation: grossly oriented to person, place and time  Mood and Affect: mood normal, affect appropriate    Assessment:  Routine gynecologic examination  Her current medical status is satisfactory with no evidence of significant gynecologic issues. Plan:  Counseled re: diet, exercise, healthy lifestyle  Return for yearly wellness visits  Rec annual mammogram      Please note that this dictation was completed with View2Gether, the [employer] voice recognition software. Quite often unanticipated grammatical, syntax, homophones, and other interpretive errors are inadvertently transcribed by the computer software. Please disregard these errors. Please excuse any errors that have escaped final proofreading.

## 2024-08-15 NOTE — PROGRESS NOTES
Parvez Porras is a 49 y.o. female returns for an annual exam     Chief Complaint   Patient presents with    Annual Exam       No LMP recorded. Patient is postmenopausal.  Her periods are absent.   Problems: no problems  Birth Control: post menopausal status.  Last Pap: ascus, HPV negative obtained on 3/29/21.  She does not have a history of OMARI 2, 3 or cervical cancer.   Last Mammogram: had her mammogram today in our office.      Examination chaperoned by Tatyana Lopez MA.

## 2024-08-21 ENCOUNTER — OFFICE VISIT (OUTPATIENT)
Age: 49
End: 2024-08-21
Payer: COMMERCIAL

## 2024-08-21 VITALS
SYSTOLIC BLOOD PRESSURE: 137 MMHG | DIASTOLIC BLOOD PRESSURE: 85 MMHG | BODY MASS INDEX: 24.72 KG/M2 | WEIGHT: 144 LBS | HEART RATE: 77 BPM

## 2024-08-21 DIAGNOSIS — Z01.419 ENCOUNTER FOR GYNECOLOGICAL EXAMINATION WITHOUT ABNORMAL FINDING: ICD-10-CM

## 2024-08-21 DIAGNOSIS — R92.8 ABNORMAL MAMMOGRAM: Primary | ICD-10-CM

## 2024-08-21 DIAGNOSIS — Z12.4 CERVICAL CANCER SCREENING: ICD-10-CM

## 2024-08-21 PROCEDURE — 99396 PREV VISIT EST AGE 40-64: CPT | Performed by: OBSTETRICS & GYNECOLOGY

## 2024-08-21 NOTE — PROGRESS NOTES
Annual exam    Parvez Porras is a No obstetric history on file.,  49 y.o. female   No LMP recorded. Patient is postmenopausal.    She presents for her annual checkup.     She has no significant complaints     Hormonal status:  She reports no perimenstrual type symptoms.   She is not having vasomotor symptoms.  The patient is not using any ERT.     Sexual history:    She  reports being sexually active and has had partner(s) who are male. She reports using the following method of birth control/protection: Post-menopausal.    Per Nursing Note:   No LMP recorded. Patient is postmenopausal.  Her periods are absent.   Problems: no problems  Birth Control: post menopausal status.  Last Pap: ascus, HPV negative obtained on 3/29/21.  She does not have a history of OMARI 2, 3 or cervical cancer.   Last Mammogram: had her mammogram today in our office.    No past medical history on file.  Past Surgical History:   Procedure Laterality Date    BREAST BIOPSY      LAPAROSCOPY  1999 and 2000    Right Ovary and tube       Current Outpatient Medications   Medication Sig Dispense Refill    ALPRAZolam (XANAX) 0.25 MG tablet Take 2 tablets by mouth.      estradiol (ESTRACE) 0.1 MG/GM vaginal cream Place 1 g vaginally Twice a Week      famotidine (PEPCID) 20 MG tablet Take 1 tablet by mouth      omeprazole (PRILOSEC OTC) 20 MG tablet Take 1 tablet by mouth every morning      sertraline (ZOLOFT) 100 MG tablet Take 25 mg by mouth      Cholecalciferol 50 MCG (2000 UT) TABS Take 1 tablet by mouth (Patient not taking: Reported on 8/21/2024)       No current facility-administered medications for this visit.     Allergies: Dust mite extract, Molds & smuts, and Hydrocodone-acetaminophen     Tobacco History:  reports that she has never smoked. She has never used smokeless tobacco.  Alcohol Abuse:  reports that she does not currently use alcohol.  Drug Abuse:  reports no history of drug use.    Family Medical/Cancer History:   Family History

## 2024-08-26 LAB
CYTOLOGIST CVX/VAG CYTO: NORMAL
CYTOLOGY CVX/VAG DOC CYTO: NORMAL
CYTOLOGY CVX/VAG DOC THIN PREP: NORMAL
DX ICD CODE: NORMAL
HPV GENOTYPE REFLEX: NORMAL
HPV I/H RISK 4 DNA CVX QL PROBE+SIG AMP: NEGATIVE
Lab: NORMAL
OTHER STN SPEC: NORMAL
STAT OF ADQ CVX/VAG CYTO-IMP: NORMAL

## 2024-12-10 ENCOUNTER — TELEPHONE (OUTPATIENT)
Age: 49
End: 2024-12-10

## 2024-12-10 NOTE — TELEPHONE ENCOUNTER
Two patient identifiers used    49 year old patient last seen in the office on 8/21/2024 for ae.      Patient sent this message  Parvez Porras   to [facility] (supporting Clotilde Porras MD)   FW      12/6/24 10:54 AM  It has turned into a full blown yeast infection with discharge and itching and burning. Can you call in at least 3 doses of Diflucan for me? to [facility] pharmacy  Clotilde Porras MD   to Parvez Porras         12/6/24 11:56 AM  I have sent diflucan to your pharmacy.  If you continue to have issues then you should come in for a culture.     Last read by Parvez Porras at 11:55 AM on 12/10/2024.        Patient started the diflucan on 12/6/ and has taken the second dose with one more to go.    Patient reports continued symptoms of burning with urination and frequency .    This nurse suggested for the patient to seek evaluation for uti at her PCP.    Patient verbalized understanding.

## 2024-12-11 ENCOUNTER — HOSPITAL ENCOUNTER (EMERGENCY)
Facility: HOSPITAL | Age: 49
Discharge: HOME | End: 2024-12-11
Attending: EMERGENCY MEDICINE
Payer: COMMERCIAL

## 2024-12-11 VITALS
TEMPERATURE: 97.5 F | HEIGHT: 64 IN | HEART RATE: 90 BPM | OXYGEN SATURATION: 100 % | SYSTOLIC BLOOD PRESSURE: 148 MMHG | RESPIRATION RATE: 16 BRPM | WEIGHT: 142 LBS | BODY MASS INDEX: 24.24 KG/M2 | DIASTOLIC BLOOD PRESSURE: 98 MMHG

## 2024-12-11 DIAGNOSIS — R11.2 NAUSEA AND VOMITING, UNSPECIFIED VOMITING TYPE: Primary | ICD-10-CM

## 2024-12-11 DIAGNOSIS — N76.0 BACTERIAL VAGINOSIS: ICD-10-CM

## 2024-12-11 DIAGNOSIS — B96.89 BACTERIAL VAGINOSIS: ICD-10-CM

## 2024-12-11 LAB
ALBUMIN SERPL-MCNC: 3.8 G/DL (ref 3.5–5)
ALBUMIN/GLOB SERPL: 1.1 (ref 1.1–2.2)
ALP SERPL-CCNC: 73 U/L (ref 45–117)
ALT SERPL-CCNC: 21 U/L (ref 12–78)
ANION GAP SERPL CALC-SCNC: 5 MMOL/L (ref 2–12)
APPEARANCE UR: CLEAR
AST SERPL-CCNC: 33 U/L (ref 15–37)
BACTERIA URNS QL MICRO: NEGATIVE /HPF
BASOPHILS # BLD: 0.1 K/UL (ref 0–0.1)
BASOPHILS NFR BLD: 1 % (ref 0–1)
BILIRUB SERPL-MCNC: 0.5 MG/DL (ref 0.2–1)
BILIRUB UR QL: NEGATIVE
BUN SERPL-MCNC: 12 MG/DL (ref 6–20)
BUN/CREAT SERPL: 12 (ref 12–20)
CALCIUM SERPL-MCNC: 8.8 MG/DL (ref 8.5–10.1)
CHLORIDE SERPL-SCNC: 102 MMOL/L (ref 97–108)
CLUE CELLS VAG QL WET PREP: ABNORMAL
CO2 SERPL-SCNC: 26 MMOL/L (ref 21–32)
COLOR UR: YELLOW
CREAT SERPL-MCNC: 1.02 MG/DL (ref 0.55–1.02)
DIFFERENTIAL METHOD BLD: ABNORMAL
EOSINOPHIL # BLD: 0 K/UL (ref 0–0.4)
EOSINOPHIL NFR BLD: 0 % (ref 0–7)
EPITH CASTS URNS QL MICRO: ABNORMAL /LPF
ERYTHROCYTE [DISTWIDTH] IN BLOOD BY AUTOMATED COUNT: 12 % (ref 11.5–14.5)
FLUAV RNA SPEC QL NAA+PROBE: NOT DETECTED
FLUBV RNA SPEC QL NAA+PROBE: NOT DETECTED
GLOBULIN SER CALC-MCNC: 3.6 G/DL (ref 2–4)
GLUCOSE SERPL-MCNC: 142 MG/DL (ref 65–100)
GLUCOSE UR STRIP.AUTO-MCNC: NEGATIVE MG/DL
HCT VFR BLD AUTO: 39.8 % (ref 35–47)
HGB BLD-MCNC: 13.2 G/DL (ref 11.5–16)
HGB UR QL STRIP: ABNORMAL
HYALINE CASTS URNS QL MICRO: ABNORMAL /LPF (ref 0–2)
IMM GRANULOCYTES # BLD AUTO: 0 K/UL (ref 0–0.04)
IMM GRANULOCYTES NFR BLD AUTO: 0 % (ref 0–0.5)
KETONES UR QL STRIP.AUTO: NEGATIVE MG/DL
LEUKOCYTE ESTERASE UR QL STRIP.AUTO: NEGATIVE
LIPASE SERPL-CCNC: 31 U/L (ref 13–75)
LYMPHOCYTES # BLD: 0.9 K/UL (ref 0.8–3.5)
LYMPHOCYTES NFR BLD: 10 % (ref 12–49)
MCH RBC QN AUTO: 29.5 PG (ref 26–34)
MCHC RBC AUTO-ENTMCNC: 33.2 G/DL (ref 30–36.5)
MCV RBC AUTO: 88.8 FL (ref 80–99)
MONOCYTES # BLD: 0.4 K/UL (ref 0–1)
MONOCYTES NFR BLD: 4 % (ref 5–13)
NEUTS SEG # BLD: 8 K/UL (ref 1.8–8)
NEUTS SEG NFR BLD: 85 % (ref 32–75)
NITRITE UR QL STRIP.AUTO: NEGATIVE
NRBC # BLD: 0 K/UL (ref 0–0.01)
NRBC BLD-RTO: 0 PER 100 WBC
PH UR STRIP: 7 (ref 5–8)
PLATELET # BLD AUTO: 250 K/UL (ref 150–400)
PMV BLD AUTO: 9.9 FL (ref 8.9–12.9)
POTASSIUM SERPL-SCNC: 4.3 MMOL/L (ref 3.5–5.1)
PROT SERPL-MCNC: 7.4 G/DL (ref 6.4–8.2)
PROT UR STRIP-MCNC: 30 MG/DL
RBC # BLD AUTO: 4.48 M/UL (ref 3.8–5.2)
RBC #/AREA URNS HPF: ABNORMAL /HPF (ref 0–5)
SARS-COV-2 RNA RESP QL NAA+PROBE: NOT DETECTED
SODIUM SERPL-SCNC: 133 MMOL/L (ref 136–145)
SOURCE: NORMAL
SP GR UR REFRACTOMETRY: 1.02 (ref 1–1.03)
T VAGINALIS VAG QL WET PREP: ABNORMAL
TROPONIN I SERPL HS-MCNC: 4 NG/L (ref 0–51)
UROBILINOGEN UR QL STRIP.AUTO: 1 EU/DL (ref 0.2–1)
WBC # BLD AUTO: 9.5 K/UL (ref 3.6–11)
WBC URNS QL MICRO: ABNORMAL /HPF (ref 0–4)
YEAST: ABNORMAL

## 2024-12-11 PROCEDURE — 96375 TX/PRO/DX INJ NEW DRUG ADDON: CPT

## 2024-12-11 PROCEDURE — 81001 URINALYSIS AUTO W/SCOPE: CPT

## 2024-12-11 PROCEDURE — 36415 COLL VENOUS BLD VENIPUNCTURE: CPT

## 2024-12-11 PROCEDURE — 96374 THER/PROPH/DIAG INJ IV PUSH: CPT

## 2024-12-11 PROCEDURE — 84484 ASSAY OF TROPONIN QUANT: CPT

## 2024-12-11 PROCEDURE — 93005 ELECTROCARDIOGRAM TRACING: CPT

## 2024-12-11 PROCEDURE — 83690 ASSAY OF LIPASE: CPT

## 2024-12-11 PROCEDURE — 87210 SMEAR WET MOUNT SALINE/INK: CPT

## 2024-12-11 PROCEDURE — 85025 COMPLETE CBC W/AUTO DIFF WBC: CPT

## 2024-12-11 PROCEDURE — 80053 COMPREHEN METABOLIC PANEL: CPT

## 2024-12-11 PROCEDURE — 99284 EMERGENCY DEPT VISIT MOD MDM: CPT

## 2024-12-11 PROCEDURE — 6360000002 HC RX W HCPCS

## 2024-12-11 PROCEDURE — 6370000000 HC RX 637 (ALT 250 FOR IP)

## 2024-12-11 PROCEDURE — 2580000003 HC RX 258

## 2024-12-11 PROCEDURE — 87636 SARSCOV2 & INF A&B AMP PRB: CPT

## 2024-12-11 RX ORDER — METOCLOPRAMIDE HYDROCHLORIDE 5 MG/ML
10 INJECTION INTRAMUSCULAR; INTRAVENOUS
Status: COMPLETED | OUTPATIENT
Start: 2024-12-11 | End: 2024-12-11

## 2024-12-11 RX ORDER — ONDANSETRON 2 MG/ML
4 INJECTION INTRAMUSCULAR; INTRAVENOUS
Status: COMPLETED | OUTPATIENT
Start: 2024-12-11 | End: 2024-12-11

## 2024-12-11 RX ORDER — PHENAZOPYRIDINE HYDROCHLORIDE 100 MG/1
200 TABLET, FILM COATED ORAL
Status: COMPLETED | OUTPATIENT
Start: 2024-12-11 | End: 2024-12-11

## 2024-12-11 RX ORDER — CEPHALEXIN 500 MG/1
500 CAPSULE ORAL 2 TIMES DAILY
Qty: 10 CAPSULE | Refills: 0 | Status: SHIPPED | OUTPATIENT
Start: 2024-12-11 | End: 2024-12-16

## 2024-12-11 RX ORDER — ONDANSETRON 8 MG/1
4 TABLET, ORALLY DISINTEGRATING ORAL EVERY 8 HOURS PRN
Qty: 6 TABLET | Refills: 0 | Status: SHIPPED | OUTPATIENT
Start: 2024-12-11

## 2024-12-11 RX ORDER — METRONIDAZOLE 500 MG/1
500 TABLET ORAL 2 TIMES DAILY
Qty: 14 TABLET | Refills: 0 | Status: SHIPPED | OUTPATIENT
Start: 2024-12-11 | End: 2024-12-18

## 2024-12-11 RX ADMIN — PHENAZOPYRIDINE 200 MG: 100 TABLET ORAL at 13:40

## 2024-12-11 RX ADMIN — ONDANSETRON 4 MG: 2 INJECTION, SOLUTION INTRAMUSCULAR; INTRAVENOUS at 13:39

## 2024-12-11 RX ADMIN — METOCLOPRAMIDE 10 MG: 5 INJECTION, SOLUTION INTRAMUSCULAR; INTRAVENOUS at 16:19

## 2024-12-11 RX ADMIN — ALUMINUM HYDROXIDE, MAGNESIUM HYDROXIDE, AND SIMETHICONE 40 ML: 1200; 120; 1200 SUSPENSION ORAL at 16:07

## 2024-12-11 RX ADMIN — SODIUM CHLORIDE 40 MG: 9 INJECTION INTRAMUSCULAR; INTRAVENOUS; SUBCUTANEOUS at 16:08

## 2024-12-11 ASSESSMENT — PAIN SCALES - GENERAL: PAINLEVEL_OUTOF10: 4

## 2024-12-11 ASSESSMENT — LIFESTYLE VARIABLES
HOW MANY STANDARD DRINKS CONTAINING ALCOHOL DO YOU HAVE ON A TYPICAL DAY: PATIENT DOES NOT DRINK
HOW OFTEN DO YOU HAVE A DRINK CONTAINING ALCOHOL: NEVER

## 2024-12-11 ASSESSMENT — PAIN - FUNCTIONAL ASSESSMENT: PAIN_FUNCTIONAL_ASSESSMENT: 0-10

## 2024-12-11 NOTE — DISCHARGE INSTRUCTIONS
Thank you for allowing us to provide you with medical care today.  We realize that you have many choices for your emergency care needs.  We thank you for choosing [facility].  Please choose us in the future for any continued health care needs.     We hope we addressed all of your medical concerns. We strive to provide excellent quality care in the Emergency Department.  Anything less than excellent does not meet our expectations.     The exam and treatment you received in the Emergency Department were for an emergent problem and are not intended as complete care. It is important that you follow up with a doctor, nurse practitioner, or physician’s assistant for ongoing care. If your symptoms worsen or you do not improve as expected and you are unable to reach your usual health care provider, you should return to the Emergency Department. We are available 24 hours a day.     Take this sheet with you when you go to your follow-up visit.     If you have any problem arranging the follow-up visit, contact the Emergency Department immediately.     Make an appointment your family doctor for follow up of this visit. Return to the ER if you are unable to be seen in a timely manner.     Below is a summary of your results.    Labs  Recent Results (from the past 12 hour(s))   CBC with Auto Differential    Collection Time: 12/11/24  1:34 PM   Result Value Ref Range    WBC 9.5 3.6 - 11.0 K/uL    RBC 4.48 3.80 - 5.20 M/uL    Hemoglobin 13.2 11.5 - 16.0 g/dL    Hematocrit 39.8 35.0 - 47.0 %    MCV 88.8 80.0 - 99.0 FL    MCH 29.5 26.0 - 34.0 PG    MCHC 33.2 30.0 - 36.5 g/dL    RDW 12.0 11.5 - 14.5 %    Platelets 250 150 - 400 K/uL    MPV 9.9 8.9 - 12.9 FL    Nucleated RBCs 0.0 0  WBC    nRBC 0.00 0.00 - 0.01 K/uL    Neutrophils % 85 (H) 32 - 75 %    Lymphocytes % 10 (L) 12 - 49 %    Monocytes % 4 (L) 5 - 13 %    Eosinophils % 0 0 - 7 %    Basophils % 1 0 - 1 %    Immature Granulocytes % 0 0.0 - 0.5 %

## 2024-12-11 NOTE — ED TRIAGE NOTES
Pt presents to the ER today via Triage.  Pt chief compliant is that of dgenerally not feeling well and burning urination.  Pt diagnosed with a UTI yesterday.

## 2024-12-11 NOTE — ED PROVIDER NOTES
[facility] EMERGENCY DEPT  EMERGENCY DEPARTMENT ENCOUNTER      Date: 12/11/2024  Patient Name: Parvez Porras  MRN: [id number]  Birthdate 1975  Date of evaluation: 12/11/2024  Provider: CATERINA Perez NP   Note Started: 1:26 PM EST 12/11/24    CHIEF COMPLAINT     Chief Complaint   Patient presents with    Dysuria       HISTORY OF PRESENT ILLNESS  (Onset, Location, Duration, Character, Alleviating/Aggravating, Radiation, Timing, Severity)   Note limiting factors.   History Provided By: Patient and     HPI: Parvez Porras is a 49 y.o. female with a history of GERD, lichen planus presents with nausea and vomiting. Dx with UTI (dysuria/frequency - onset x 3 days) yesterday and given bactrim. Had coffee this morning. Denies fevers, diarrhea, cp, respiratory distress, swelling, bleeding.   Took tylenol cold and xanax. Endorses some sob she relates to her anxiety, resolved PTA.   Took \"a few amoxicillin a few weeks ago.\"  Denies EtOH, drugs, smoking, vaping, marijuana.    Nursing Notes and triage vitals were reviewed.  PCP: Kristin Mejia APRN - NP      PAST MEDICAL HISTORY   Past Medical History:  No past medical history on file.    Past Surgical History:  Past Surgical History:   Procedure Laterality Date    BREAST BIOPSY      LAPAROSCOPY  1999 and 2000    Right Ovary and tube       Family History:  Family History   Problem Relation Age of Onset    Heart Surgery Paternal Grandfather     Hypertension Paternal Grandfather     Asthma Brother        Social History:  Social History     Tobacco Use    Smoking status: Never    Smokeless tobacco: Never   Substance Use Topics    Alcohol use: Not Currently    Drug use: Never       Allergies:  Allergies   Allergen Reactions    Dust Mite Extract Other (See Comments)    Molds & Smuts Other (See Comments)    Hydrocodone-Acetaminophen Other (See Comments)     Unknown reaction per pt       Current Meds:   Current Facility-Administered Medications

## 2024-12-13 LAB
EKG ATRIAL RATE: 67 BPM
EKG DIAGNOSIS: NORMAL
EKG P AXIS: 73 DEGREES
EKG P-R INTERVAL: 152 MS
EKG Q-T INTERVAL: 402 MS
EKG QRS DURATION: 90 MS
EKG QTC CALCULATION (BAZETT): 424 MS
EKG R AXIS: 66 DEGREES
EKG T AXIS: 56 DEGREES
EKG VENTRICULAR RATE: 67 BPM

## 2025-02-05 ENCOUNTER — TRANSCRIBE ORDERS (OUTPATIENT)
Facility: HOSPITAL | Age: 50
End: 2025-02-05

## 2025-02-05 DIAGNOSIS — J32.9 CHRONIC SINUSITIS, UNSPECIFIED LOCATION: Primary | ICD-10-CM

## 2025-02-05 DIAGNOSIS — J34.2 NASAL SEPTAL DEVIATION: ICD-10-CM

## 2025-04-15 ENCOUNTER — TELEPHONE (OUTPATIENT)
Age: 50
End: 2025-04-15

## 2025-04-15 DIAGNOSIS — R10.2 PELVIC PAIN: Primary | ICD-10-CM

## 2025-04-15 NOTE — TELEPHONE ENCOUNTER
I fibroid would not likely cause these issues.  However, we can schedule a (non-urgent) ultrasound to clarify if a fibroid is present.  She likely needs to see urology due to the recurrent UTI's.  Please schedule ultrasound and follow-up.     Mom called because son was bit by a deer tick and is wondering what to watch for or if she should be seen. Son is on his way home from [facility]. Can contact him or mom. Please call. Thank you

## 2025-04-15 NOTE — TELEPHONE ENCOUNTER
PT name and  verified    48 yo last ov 24, last ae 24, next ae 25      PT calling, was trying to make a problem ov, and was transferred.  PT has complaints of recurrent UTI's, abdominal pain and cramping, urgency. PT states she called here 12/10/24, and was having sx of UTI and Diflucan was sent. PT went to the ER next day 24, and was tx for BV.  PT has followed up with her PCP, last saw 25 and being tx with Macrobid for UTI, waiting on culture. PT had a CT in March, ordered by her PCP for her abdominal pain, at [facility] and was told she has a \"possible uterine fibroid\".  PT does not know if some of her sx and recurring sx are because of this, and therefore was trying to schedule an ov.  PT states she can send a copy of the CT scan to her [facility] for record.    Please advise  Thank you

## 2025-04-16 NOTE — TELEPHONE ENCOUNTER
PT was called back by RN, name and  verified    RN relayed MD's message/advice:  Clotilde Porras MD to Me      4/15/25  4:17 PM  Note     I fibroid would not likely cause these issues.  However, we can schedule a (non-urgent) ultrasound to clarify if a fibroid is present.  She likely needs to see urology due to the recurrent UTI's.  Please schedule ultrasound and follow-up.            PT voices understanding and first available us with ov, is 25. RN offered that date and times available, PT accepts and was placed on the schedule for 25 2 pm us and 240 pm ov.      Orders pended to MD  Thank you

## 2025-08-22 ENCOUNTER — OFFICE VISIT (OUTPATIENT)
Age: 50
End: 2025-08-22
Payer: COMMERCIAL

## 2025-08-22 VITALS
DIASTOLIC BLOOD PRESSURE: 80 MMHG | SYSTOLIC BLOOD PRESSURE: 138 MMHG | RESPIRATION RATE: 18 BRPM | BODY MASS INDEX: 24.37 KG/M2 | HEIGHT: 64 IN | OXYGEN SATURATION: 99 % | HEART RATE: 50 BPM

## 2025-08-22 DIAGNOSIS — Z01.419 ENCOUNTER FOR WELL WOMAN EXAM WITH ROUTINE GYNECOLOGICAL EXAM: Primary | ICD-10-CM

## 2025-08-22 PROCEDURE — 99459 PELVIC EXAMINATION: CPT | Performed by: OBSTETRICS & GYNECOLOGY

## 2025-08-22 PROCEDURE — 99396 PREV VISIT EST AGE 40-64: CPT | Performed by: OBSTETRICS & GYNECOLOGY

## 2025-08-22 SDOH — ECONOMIC STABILITY: FOOD INSECURITY: WITHIN THE PAST 12 MONTHS, YOU WORRIED THAT YOUR FOOD WOULD RUN OUT BEFORE YOU GOT MONEY TO BUY MORE.: NEVER TRUE

## 2025-08-22 SDOH — ECONOMIC STABILITY: FOOD INSECURITY: WITHIN THE PAST 12 MONTHS, THE FOOD YOU BOUGHT JUST DIDN'T LAST AND YOU DIDN'T HAVE MONEY TO GET MORE.: NEVER TRUE

## 2025-08-22 ASSESSMENT — PATIENT HEALTH QUESTIONNAIRE - PHQ9
1. LITTLE INTEREST OR PLEASURE IN DOING THINGS: NOT AT ALL
2. FEELING DOWN, DEPRESSED OR HOPELESS: NOT AT ALL
SUM OF ALL RESPONSES TO PHQ QUESTIONS 1-9: 0

## (undated) DEVICE — GLOVE SURG SZ 7 L12IN FNGR THK79MIL GRN LTX FREE

## (undated) DEVICE — ALCOHOL RUBBING ISO 16OZ 70%

## (undated) DEVICE — SOLUTION IRRIG 1000ML STRL H2O USP PLAS POUR BTL

## (undated) DEVICE — SOLUTION IRRIG 1000ML 0.9% SOD CHL USP POUR PLAS BTL

## (undated) DEVICE — GLOVE SURG SZ 85 L12IN THK75MIL DK GRN LTX FREE

## (undated) DEVICE — GOWN,SIRUS,NONRNF,SETINSLV,2XL,18/CS: Brand: MEDLINE

## (undated) DEVICE — GAUZE BORDERED 4X8 --

## (undated) DEVICE — SUT STRATA PGA 3-0 PS-2 60CM -- STRATAFIX PGA PCL

## (undated) DEVICE — SUTURE PERMAHAND SZ 3-0 L30IN NONABSORBABLE BLK SILK BRAID A304H

## (undated) DEVICE — SCREW EXT FIX L12MM FOR DISTRCTN

## (undated) DEVICE — DRAPE C-ARMOUR C-ARM KIT --

## (undated) DEVICE — KIT EVACUATOR 7FR 400CC PVC RADIOPAQUE

## (undated) DEVICE — DRAPE,LAPAROTOMY,PED,STERILE: Brand: MEDLINE

## (undated) DEVICE — DRAPE MICSCP W46XL120IN FOR ZEISS MD FEATURING CLEARLENS

## (undated) DEVICE — STRIP,CLOSURE,WOUND,MEDI-STRIP,1/2X4: Brand: MEDLINE

## (undated) DEVICE — TOTAL TRAY, 16FR 10ML SIL FOLEY, URN: Brand: MEDLINE

## (undated) DEVICE — SUTURE VCRL SZ 3-0 L27IN ABSRB UD L26MM SH 1/2 CIR J416H

## (undated) DEVICE — APPLIER CLP LIG SM 9IN DISP -- LIGACLIP

## (undated) DEVICE — SYR 10ML LUER LOK 1/5ML GRAD --

## (undated) DEVICE — TIP CLEANER: Brand: VALLEYLAB

## (undated) DEVICE — SPONGE GZ W4XL4IN COT 12 PLY TYP VII WVN C FLD DSGN

## (undated) DEVICE — APPLICATOR MEDICATED 10.5 CC SOLUTION HI LT ORNG CHLORAPREP

## (undated) DEVICE — TAPE,CLOTH/SILK,CURAD,3"X10YD,LF,40/CS: Brand: CURAD

## (undated) DEVICE — TOOL DC SP12MH30 LGD 12CM PROX 3.0 MH: Brand: MIDAS REX CLEARVIEW™

## (undated) DEVICE — CANISTER, RIGID, 3000CC: Brand: MEDLINE INDUSTRIES, INC.

## (undated) DEVICE — SEALANT HEMOSTAT W/THROM 8ML -- SURGIFLO MATRIX

## (undated) DEVICE — PREMIUM WET SKIN PREP TRAY: Brand: MEDLINE INDUSTRIES, INC.

## (undated) DEVICE — TAPE,ELASTIC,FOAM,CURAD,3"X5.5YD,LF: Brand: CURAD

## (undated) DEVICE — DERMABOND SKIN ADH 0.7ML -- DERMABOND ADVANCED 12/BX

## (undated) DEVICE — DRAPE,REIN 53X77,STERILE: Brand: MEDLINE

## (undated) DEVICE — TOBRA BONE BASKET- AUTOGRAFT BONE COLLECTION SYSTEM WITH MESH FILTER AND GRAFT COMPRESSOR: Brand: TOBRA BONE BASKET

## (undated) DEVICE — STRAP,POSITIONING,KNEE/BODY,FOAM,4X60": Brand: MEDLINE

## (undated) DEVICE — PACKING 8004000 NEURAY 200PK 13X13MM: Brand: NEURAY ®

## (undated) DEVICE — Device

## (undated) DEVICE — INTENDED FOR TISSUE SEPARATION, AND OTHER PROCEDURES THAT REQUIRE A SHARP SURGICAL BLADE TO PUNCTURE OR CUT.: Brand: BARD-PARKER ® CARBON RIB-BACK BLADES

## (undated) DEVICE — TRAP,MUCUS SPECIMEN, 80CC: Brand: MEDLINE

## (undated) DEVICE — CERVICAL-SFMC: Brand: MEDLINE INDUSTRIES, INC.

## (undated) DEVICE — SYR LR LCK 1ML GRAD NSAF 30ML --

## (undated) DEVICE — GLOVE SURG SZ 65 THK91MIL LTX FREE SYN POLYISOPRENE

## (undated) DEVICE — POWDER HEMOSTAT GEL 3.0GR -- SURGICEL

## (undated) DEVICE — COVER,MAYO STAND,XL,STERILE: Brand: MEDLINE

## (undated) DEVICE — TOWEL SURG W17XL27IN STD BLU COT NONFENESTRATED PREWASHED